# Patient Record
Sex: MALE | Race: BLACK OR AFRICAN AMERICAN | NOT HISPANIC OR LATINO | URBAN - METROPOLITAN AREA
[De-identification: names, ages, dates, MRNs, and addresses within clinical notes are randomized per-mention and may not be internally consistent; named-entity substitution may affect disease eponyms.]

---

## 2022-04-16 ENCOUNTER — INPATIENT (INPATIENT)
Facility: HOSPITAL | Age: 62
LOS: 9 days | Discharge: HOME | DRG: 64 | End: 2022-04-26
Attending: PSYCHIATRY & NEUROLOGY | Admitting: PSYCHIATRY & NEUROLOGY
Payer: COMMERCIAL

## 2022-04-16 VITALS
HEART RATE: 101 BPM | OXYGEN SATURATION: 94 % | SYSTOLIC BLOOD PRESSURE: 209 MMHG | HEIGHT: 72 IN | DIASTOLIC BLOOD PRESSURE: 134 MMHG | WEIGHT: 220.02 LBS | RESPIRATION RATE: 18 BRPM | TEMPERATURE: 97 F

## 2022-04-16 LAB
ALBUMIN SERPL ELPH-MCNC: 4.9 G/DL — SIGNIFICANT CHANGE UP (ref 3.3–5)
ALP SERPL-CCNC: 73 U/L — SIGNIFICANT CHANGE UP (ref 40–120)
ALT FLD-CCNC: 21 U/L — SIGNIFICANT CHANGE UP (ref 10–45)
ANION GAP SERPL CALC-SCNC: 18 MMOL/L — HIGH (ref 5–17)
APTT BLD: 31.4 SEC — SIGNIFICANT CHANGE UP (ref 27.5–35.5)
AST SERPL-CCNC: 52 U/L — HIGH (ref 10–40)
BASE EXCESS BLDV CALC-SCNC: 3.2 MMOL/L — HIGH (ref -2–2)
BASOPHILS # BLD AUTO: 0.01 K/UL — SIGNIFICANT CHANGE UP (ref 0–0.2)
BASOPHILS NFR BLD AUTO: 0.1 % — SIGNIFICANT CHANGE UP (ref 0–2)
BILIRUB SERPL-MCNC: 0.6 MG/DL — SIGNIFICANT CHANGE UP (ref 0.2–1.2)
BLD GP AB SCN SERPL QL: NEGATIVE — SIGNIFICANT CHANGE UP
BLOOD GAS VENOUS - CREATININE: SIGNIFICANT CHANGE UP MG/DL (ref 0.5–1.3)
BUN SERPL-MCNC: 13 MG/DL — SIGNIFICANT CHANGE UP (ref 7–23)
CA-I SERPL-SCNC: 1.13 MMOL/L — LOW (ref 1.15–1.33)
CALCIUM SERPL-MCNC: 9.6 MG/DL — SIGNIFICANT CHANGE UP (ref 8.4–10.5)
CHLORIDE BLDV-SCNC: 98 MMOL/L — SIGNIFICANT CHANGE UP (ref 96–108)
CHLORIDE SERPL-SCNC: 98 MMOL/L — SIGNIFICANT CHANGE UP (ref 96–108)
CK MB CFR SERPL CALC: 4.9 NG/ML — SIGNIFICANT CHANGE UP (ref 0–6.7)
CO2 BLDV-SCNC: 30 MMOL/L — HIGH (ref 22–26)
CO2 SERPL-SCNC: 24 MMOL/L — SIGNIFICANT CHANGE UP (ref 22–31)
CREAT SERPL-MCNC: 1.07 MG/DL — SIGNIFICANT CHANGE UP (ref 0.5–1.3)
EGFR: 78 ML/MIN/1.73M2 — SIGNIFICANT CHANGE UP
EOSINOPHIL # BLD AUTO: 0 K/UL — SIGNIFICANT CHANGE UP (ref 0–0.5)
EOSINOPHIL NFR BLD AUTO: 0 % — SIGNIFICANT CHANGE UP (ref 0–6)
FLUAV AG NPH QL: SIGNIFICANT CHANGE UP
FLUBV AG NPH QL: SIGNIFICANT CHANGE UP
GAS PNL BLDV: 134 MMOL/L — LOW (ref 136–145)
GAS PNL BLDV: SIGNIFICANT CHANGE UP
GAS PNL BLDV: SIGNIFICANT CHANGE UP
GLUCOSE BLDV-MCNC: 131 MG/DL — HIGH (ref 70–99)
GLUCOSE SERPL-MCNC: 124 MG/DL — HIGH (ref 70–99)
HCO3 BLDV-SCNC: 28 MMOL/L — SIGNIFICANT CHANGE UP (ref 22–29)
HCT VFR BLD CALC: 44.9 % — SIGNIFICANT CHANGE UP (ref 39–50)
HCT VFR BLDA CALC: 39 % — SIGNIFICANT CHANGE UP (ref 39–51)
HGB BLD CALC-MCNC: 13.1 G/DL — SIGNIFICANT CHANGE UP (ref 12.6–17.4)
HGB BLD-MCNC: 14.5 G/DL — SIGNIFICANT CHANGE UP (ref 13–17)
IMM GRANULOCYTES NFR BLD AUTO: 0.4 % — SIGNIFICANT CHANGE UP (ref 0–1.5)
INR BLD: 1.28 RATIO — HIGH (ref 0.88–1.16)
LACTATE BLDV-MCNC: 2.1 MMOL/L — HIGH (ref 0.7–2)
LYMPHOCYTES # BLD AUTO: 0.61 K/UL — LOW (ref 1–3.3)
LYMPHOCYTES # BLD AUTO: 6.3 % — LOW (ref 13–44)
MAGNESIUM SERPL-MCNC: 2 MG/DL — SIGNIFICANT CHANGE UP (ref 1.6–2.6)
MCHC RBC-ENTMCNC: 27.4 PG — SIGNIFICANT CHANGE UP (ref 27–34)
MCHC RBC-ENTMCNC: 32.3 GM/DL — SIGNIFICANT CHANGE UP (ref 32–36)
MCV RBC AUTO: 84.9 FL — SIGNIFICANT CHANGE UP (ref 80–100)
MONOCYTES # BLD AUTO: 0.55 K/UL — SIGNIFICANT CHANGE UP (ref 0–0.9)
MONOCYTES NFR BLD AUTO: 5.7 % — SIGNIFICANT CHANGE UP (ref 2–14)
NEUTROPHILS # BLD AUTO: 8.52 K/UL — HIGH (ref 1.8–7.4)
NEUTROPHILS NFR BLD AUTO: 87.5 % — HIGH (ref 43–77)
NRBC # BLD: 0 /100 WBCS — SIGNIFICANT CHANGE UP (ref 0–0)
PCO2 BLDV: 45 MMHG — SIGNIFICANT CHANGE UP (ref 42–55)
PH BLDV: 7.41 — SIGNIFICANT CHANGE UP (ref 7.32–7.43)
PHOSPHATE SERPL-MCNC: 2.7 MG/DL — SIGNIFICANT CHANGE UP (ref 2.5–4.5)
PLATELET # BLD AUTO: 166 K/UL — SIGNIFICANT CHANGE UP (ref 150–400)
PO2 BLDV: 44 MMHG — SIGNIFICANT CHANGE UP (ref 25–45)
POTASSIUM BLDV-SCNC: 3.9 MMOL/L — SIGNIFICANT CHANGE UP (ref 3.5–5.1)
POTASSIUM SERPL-MCNC: 4.8 MMOL/L — SIGNIFICANT CHANGE UP (ref 3.5–5.3)
POTASSIUM SERPL-SCNC: 4.8 MMOL/L — SIGNIFICANT CHANGE UP (ref 3.5–5.3)
PROT SERPL-MCNC: 8.9 G/DL — HIGH (ref 6–8.3)
PROTHROM AB SERPL-ACNC: 14.8 SEC — HIGH (ref 10.5–13.4)
RBC # BLD: 5.29 M/UL — SIGNIFICANT CHANGE UP (ref 4.2–5.8)
RBC # FLD: 14.5 % — SIGNIFICANT CHANGE UP (ref 10.3–14.5)
RH IG SCN BLD-IMP: POSITIVE — SIGNIFICANT CHANGE UP
RH IG SCN BLD-IMP: POSITIVE — SIGNIFICANT CHANGE UP
RSV RNA NPH QL NAA+NON-PROBE: SIGNIFICANT CHANGE UP
SAO2 % BLDV: 71.3 % — SIGNIFICANT CHANGE UP (ref 67–88)
SARS-COV-2 RNA SPEC QL NAA+PROBE: SIGNIFICANT CHANGE UP
SODIUM SERPL-SCNC: 140 MMOL/L — SIGNIFICANT CHANGE UP (ref 135–145)
TROPONIN T, HIGH SENSITIVITY RESULT: 16 NG/L — SIGNIFICANT CHANGE UP (ref 0–51)
TROPONIN T, HIGH SENSITIVITY RESULT: 22 NG/L — SIGNIFICANT CHANGE UP (ref 0–51)
WBC # BLD: 9.73 K/UL — SIGNIFICANT CHANGE UP (ref 3.8–10.5)
WBC # FLD AUTO: 9.73 K/UL — SIGNIFICANT CHANGE UP (ref 3.8–10.5)

## 2022-04-16 PROCEDURE — 71046 X-RAY EXAM CHEST 2 VIEWS: CPT | Mod: 26

## 2022-04-16 PROCEDURE — 70496 CT ANGIOGRAPHY HEAD: CPT | Mod: 26,MA

## 2022-04-16 PROCEDURE — 70498 CT ANGIOGRAPHY NECK: CPT | Mod: 26,MA

## 2022-04-16 PROCEDURE — 93010 ELECTROCARDIOGRAM REPORT: CPT

## 2022-04-16 PROCEDURE — 99291 CRITICAL CARE FIRST HOUR: CPT | Mod: 25

## 2022-04-16 RX ORDER — METOCLOPRAMIDE HCL 10 MG
10 TABLET ORAL ONCE
Refills: 0 | Status: COMPLETED | OUTPATIENT
Start: 2022-04-16 | End: 2022-04-16

## 2022-04-16 RX ORDER — ACETAMINOPHEN 500 MG
1000 TABLET ORAL ONCE
Refills: 0 | Status: COMPLETED | OUTPATIENT
Start: 2022-04-16 | End: 2022-04-16

## 2022-04-16 RX ORDER — HYDRALAZINE HCL 50 MG
10 TABLET ORAL ONCE
Refills: 0 | Status: COMPLETED | OUTPATIENT
Start: 2022-04-16 | End: 2022-04-16

## 2022-04-16 RX ORDER — HYDRALAZINE HCL 50 MG
5 TABLET ORAL ONCE
Refills: 0 | Status: COMPLETED | OUTPATIENT
Start: 2022-04-16 | End: 2022-04-16

## 2022-04-16 RX ADMIN — Medication 10 MILLIGRAM(S): at 20:33

## 2022-04-16 RX ADMIN — Medication 5 MILLIGRAM(S): at 22:16

## 2022-04-16 RX ADMIN — Medication 1000 MILLIGRAM(S): at 22:26

## 2022-04-16 RX ADMIN — Medication 1.25 MILLIGRAM(S): at 20:17

## 2022-04-16 RX ADMIN — Medication 10 MILLIGRAM(S): at 23:23

## 2022-04-16 RX ADMIN — Medication 400 MILLIGRAM(S): at 20:05

## 2022-04-16 RX ADMIN — Medication 1000 MILLIGRAM(S): at 20:31
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
## 2022-04-16 NOTE — ED ADULT NURSE NOTE - HISTORY OF COVID-19 VACCINATION
Patient Instructions by Zoë Gonzalez MD at 9/12/2019  2:40 PM     Author: Zoë Gonzalez MD Service: -- Author Type: Physician    Filed: 9/12/2019  3:10 PM Encounter Date: 9/12/2019 Status: Addendum    : Zoë Gonzalez MD (Physician)    Related Notes: Original Note by Zoë Gonzalez MD (Physician) filed at 9/12/2019  3:06 PM         9/12/2019  Wt Readings from Last 1 Encounters:   09/12/19 42 lb 6.4 oz (19.2 kg) (97 %, Z= 1.86)*     * Growth percentiles are based on CDC (Boys, 2-20 Years) data.       Acetaminophen Dosing Instructions  (May take every 4-6 hours)      WEIGHT   AGE Infant/Children's  160mg/5ml Children's   Chewable Tabs  80 mg each Kevin Strength  Chewable Tabs  160 mg     Milliliter (ml) Soft Chew Tabs Chewable Tabs   6-11 lbs 0-3 months 1.25 ml     12-17 lbs 4-11 months 2.5 ml     18-23 lbs 12-23 months 3.75 ml     24-35 lbs 2-3 years 5 ml 2 tabs    36-47 lbs 4-5 years 7.5 ml 3 tabs    48-59 lbs 6-8 years 10 ml 4 tabs 2 tabs   60-71 lbs 9-10 years 12.5 ml 5 tabs 2.5 tabs   72-95 lbs 11 years 15 ml 6 tabs 3 tabs   96 lbs and over 12 years   4 tabs     Ibuprofen Dosing Instructions- Liquid  (May take every 6-8 hours)      WEIGHT   AGE Concentrated Drops   50 mg/1.25 ml Infant/Children's   100 mg/5ml     Dropperful Milliliter (ml)   12-17 lbs 6- 11 months 1 (1.25 ml)    18-23 lbs 12-23 months 1 1/2 (1.875 ml)    24-35 lbs 2-3 years  5 ml   36-47 lbs 4-5 years  7.5 ml   48-59 lbs 6-8 years  10 ml   60-71 lbs 9-10 years  12.5 ml   72-95 lbs 11 years  15 ml       Ibuprofen Dosing Instructions- Tablets/Caplets  (May take every 6-8 hours)    WEIGHT AGE Children's   Chewable Tabs   50 mg Kevin Strength   Chewable Tabs   100 mg Kevin Strength   Caplets    100 mg     Tablet Tablet Caplet   24-35 lbs 2-3 years 2 tabs     36-47 lbs 4-5 years 3 tabs     48-59 lbs 6-8 years 4 tabs 2 tabs 2 caps   60-71 lbs 9-10 years 5 tabs 2.5 tabs 2.5 caps   72-95 lbs 11 years 6 tabs 3 tabs 3 caps            Patient Education             [facility] Parent Handout   3 Year Visit  Here are some suggestions from [facility] experts that may be of value to your family.     Reading and Talking With Your Child    Read books, sing songs, and play rhyming games with your child each day.    Reading together and talking about a books story and pictures helps your child learn how to read.    Use books as a way to talk together.    Look for ways to practice reading everywhere you go, such as stop signs or signs in the store.    Ask your child questions about the story or pictures. Ask him to tell a part of the story.    Ask your child to tell you about his day, friends, and activities.  Your Active Child  Apart from sleeping, children should not be inactive for longer than 1 hour at a time.    Be active together as a family.    Limit TV, video, and video game time to no more than 1-2 hours each day.    No TV in your say bedroom.    Keep your child from viewing shows and ads that may make her want things that are not healthy.    Be sure your child is active at home and  or .    Let us know if you need help getting your child enrolled in  or [employer]. Family Support    Take time for yourself and to be with your partner.    Parents need to stay connected to friends, their personal interests, and work.    Be aware that your parents might have different parenting styles than you.    Give your child the chance to make choices.    Show your child how to handle anger well--time alone, respectful talk, or being active. Stop hitting, biting, and fighting right away.    Reinforce rules and encourage good behavior.    Use time-outs or take away whats causing a problem.    Have regular playtimes and mealtimes together as a family.  Safety    Use a forward-facing car safety seat in the back seat of all vehicles.    Switch to a belt-positioning booster seat when your child outgrows her  forward-facing seat.    Never leave your child alone in the car, house, or yard.    Do not let young brothers and sisters watch over your child.    Your child is too young to cross the street alone.    Make sure there are operable window guards on every window on the second floor and higher. Move furniture away from windows.    Never have a gun in the home. If you must have a gun, store it unloaded and locked with the ammunition locked separately from the gun. Ask if there are guns in homes where your child plays. If so, make sure they are stored safely.    Supervise play near streets and driveways. Playing With Others  Playing with other preschoolers helps get your child ready for school.    Give your child a variety of toys for dress-up, make-believe, and imitation.    Make sure your child has the chance to play often with other preschoolers.    Help your child learn to take turns while playing games with other children.  What to Expect at Your Deanna 4 Year Visit  We will talk about    Getting ready for school    Community involvement and safety    Promoting physical activity and limiting TV time    Keeping your deanna teeth healthy    Safety inside and outside    How to be safe with adults  ________________________________  Poison Help: 1-374.738.4827  Child safety seat inspection: 5-381-CDBAFYYZR; seatcheck.org           Visit healthychildren.org for more guidance on toilet training.    [email or web address]_[email or web address]        Vaccine status unknown
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
## 2022-04-16 NOTE — ED PROVIDER NOTE - NS ED ROS FT
Gen: No F/C/NS  Head: No falls/head trauma  Eyes: No changes in vision   Resp: No cough or trouble breathing  Cardiovascular: No chest pain or palpitation  Gastroenteric: +N/V  :  No change in urine output, dysuria or hematuria   MS: No joint or muscle pain  Neuro: No headache; no abnormal movements; +confusion   Skin: No new rash
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
## 2022-04-16 NOTE — ED PROVIDER NOTE - PROGRESS NOTE DETAILS
Nemes - Radiology called w 5cm intraparenchymal bleed to R parietal lobe. No shift/herniation. NeuroSx aware, recommend decrease SBP under 160, will add Hydralazine 5mg IVP Neto ACOSTA: I was called to the bedside by RN to assess patient as he was pulling at pulse ox and shaking left arm. I assessed patient. He was awake, shaking left arm, unable to open left fist. He was able to speak to me, stating he had difficulty opening fist. Episode lasted 30 sec to 1 min. Neurosurgery was made aware. Cardene drip started. Zofran ordered as patient complained of nausea. Nsx resident came to bedside, recommended 750 mg of Keppra and repeat CT Head. Plan to admit to neurosurgery ICU.

## 2022-04-16 NOTE — ED ADULT NURSE NOTE - NSIMPLEMENTINTERV_GEN_ALL_ED
Implemented All Fall Risk Interventions:  Brandt to call system. Call bell, personal items and telephone within reach. Instruct patient to call for assistance. Room bathroom lighting operational. Non-slip footwear when patient is off stretcher. Physically safe environment: no spills, clutter or unnecessary equipment. Stretcher in lowest position, wheels locked, appropriate side rails in place. Provide visual cue, wrist band, yellow gown, etc. Monitor gait and stability. Monitor for mental status changes and reorient to person, place, and time. Review medications for side effects contributing to fall risk. Reinforce activity limits and safety measures with patient and family.

## 2022-04-16 NOTE — ED PROVIDER NOTE - ST/T WAVE
St depressions in II, inverted Ts in III, aVF, I, aVL, V4-V6, biphasic/Wellen in V3. ST elevation in aVR and V1

## 2022-04-16 NOTE — ED PROVIDER NOTE - PHYSICAL EXAMINATION
G: NAD, cooperative with exam   H: NCAT  E: EOMI, pupils dilated BL   M: Mucous membranes moist   R: CTABL, nWOB  C: RRR  A: Soft, NT/ND, no rebound/guarding   MSK: no calf tenderness, no LE edema  Neuro: CN2-12 grossly intact, A&Ox4, MS +5/5 in UE and LE BL, finger to nose smooth and rapid, gross sensation intact in UE and LE BL

## 2022-04-17 DIAGNOSIS — I16.1 HYPERTENSIVE EMERGENCY: ICD-10-CM

## 2022-04-17 LAB
A1C WITH ESTIMATED AVERAGE GLUCOSE RESULT: 6.2 % — HIGH (ref 4–5.6)
ALBUMIN SERPL ELPH-MCNC: 4.6 G/DL — SIGNIFICANT CHANGE UP (ref 3.3–5)
ALP SERPL-CCNC: 70 U/L — SIGNIFICANT CHANGE UP (ref 40–120)
ALT FLD-CCNC: 19 U/L — SIGNIFICANT CHANGE UP (ref 10–45)
ANION GAP SERPL CALC-SCNC: 14 MMOL/L — SIGNIFICANT CHANGE UP (ref 5–17)
AST SERPL-CCNC: 53 U/L — HIGH (ref 10–40)
BILIRUB DIRECT SERPL-MCNC: <0.1 MG/DL — SIGNIFICANT CHANGE UP (ref 0–0.3)
BILIRUB INDIRECT FLD-MCNC: SIGNIFICANT CHANGE UP MG/DL (ref 0.2–1)
BILIRUB SERPL-MCNC: 0.5 MG/DL — SIGNIFICANT CHANGE UP (ref 0.2–1.2)
BUN SERPL-MCNC: 15 MG/DL — SIGNIFICANT CHANGE UP (ref 7–23)
CALCIUM SERPL-MCNC: 8.6 MG/DL — SIGNIFICANT CHANGE UP (ref 8.4–10.5)
CHLORIDE SERPL-SCNC: 101 MMOL/L — SIGNIFICANT CHANGE UP (ref 96–108)
CHOLEST SERPL-MCNC: 166 MG/DL — SIGNIFICANT CHANGE UP
CO2 SERPL-SCNC: 25 MMOL/L — SIGNIFICANT CHANGE UP (ref 22–31)
CREAT SERPL-MCNC: 1.39 MG/DL — HIGH (ref 0.5–1.3)
EGFR: 57 ML/MIN/1.73M2 — LOW
ESTIMATED AVERAGE GLUCOSE: 131 MG/DL — HIGH (ref 68–114)
GLUCOSE SERPL-MCNC: 115 MG/DL — HIGH (ref 70–99)
HCT VFR BLD CALC: 40.7 % — SIGNIFICANT CHANGE UP (ref 39–50)
HDLC SERPL-MCNC: 42 MG/DL — SIGNIFICANT CHANGE UP
HGB BLD-MCNC: 12.6 G/DL — LOW (ref 13–17)
LIPID PNL WITH DIRECT LDL SERPL: 113 MG/DL — HIGH
MAGNESIUM SERPL-MCNC: 2 MG/DL — SIGNIFICANT CHANGE UP (ref 1.6–2.6)
MCHC RBC-ENTMCNC: 27.5 PG — SIGNIFICANT CHANGE UP (ref 27–34)
MCHC RBC-ENTMCNC: 31 GM/DL — LOW (ref 32–36)
MCV RBC AUTO: 88.9 FL — SIGNIFICANT CHANGE UP (ref 80–100)
NON HDL CHOLESTEROL: 124 MG/DL — SIGNIFICANT CHANGE UP
NRBC # BLD: 0 /100 WBCS — SIGNIFICANT CHANGE UP (ref 0–0)
PHOSPHATE SERPL-MCNC: 2.8 MG/DL — SIGNIFICANT CHANGE UP (ref 2.5–4.5)
PLATELET # BLD AUTO: 149 K/UL — LOW (ref 150–400)
POTASSIUM SERPL-MCNC: 3.9 MMOL/L — SIGNIFICANT CHANGE UP (ref 3.5–5.3)
POTASSIUM SERPL-SCNC: 3.9 MMOL/L — SIGNIFICANT CHANGE UP (ref 3.5–5.3)
PROT SERPL-MCNC: 8.3 G/DL — SIGNIFICANT CHANGE UP (ref 6–8.3)
RBC # BLD: 4.58 M/UL — SIGNIFICANT CHANGE UP (ref 4.2–5.8)
RBC # FLD: 14.7 % — HIGH (ref 10.3–14.5)
SODIUM SERPL-SCNC: 140 MMOL/L — SIGNIFICANT CHANGE UP (ref 135–145)
T3 SERPL-MCNC: 116 NG/DL — SIGNIFICANT CHANGE UP (ref 80–200)
T4 AB SER-ACNC: 10.5 UG/DL — SIGNIFICANT CHANGE UP (ref 4.6–12)
TRIGL SERPL-MCNC: 58 MG/DL — SIGNIFICANT CHANGE UP
TSH SERPL-MCNC: 3.1 UIU/ML — SIGNIFICANT CHANGE UP (ref 0.27–4.2)
WBC # BLD: 8.72 K/UL — SIGNIFICANT CHANGE UP (ref 3.8–10.5)
WBC # FLD AUTO: 8.72 K/UL — SIGNIFICANT CHANGE UP (ref 3.8–10.5)

## 2022-04-17 PROCEDURE — 70450 CT HEAD/BRAIN W/O DYE: CPT | Mod: 26,76

## 2022-04-17 PROCEDURE — 93306 TTE W/DOPPLER COMPLETE: CPT | Mod: 26

## 2022-04-17 PROCEDURE — 93010 ELECTROCARDIOGRAM REPORT: CPT

## 2022-04-17 PROCEDURE — 99291 CRITICAL CARE FIRST HOUR: CPT

## 2022-04-17 RX ORDER — LABETALOL HCL 100 MG
200 TABLET ORAL EVERY 8 HOURS
Refills: 0 | Status: DISCONTINUED | OUTPATIENT
Start: 2022-04-17 | End: 2022-04-18

## 2022-04-17 RX ORDER — SODIUM CHLORIDE 9 MG/ML
1000 INJECTION INTRAMUSCULAR; INTRAVENOUS; SUBCUTANEOUS
Refills: 0 | Status: DISCONTINUED | OUTPATIENT
Start: 2022-04-17 | End: 2022-04-18

## 2022-04-17 RX ORDER — ACETAMINOPHEN 500 MG
650 TABLET ORAL EVERY 6 HOURS
Refills: 0 | Status: DISCONTINUED | OUTPATIENT
Start: 2022-04-17 | End: 2022-04-26

## 2022-04-17 RX ORDER — LEVETIRACETAM 250 MG/1
500 TABLET, FILM COATED ORAL ONCE
Refills: 0 | Status: COMPLETED | OUTPATIENT
Start: 2022-04-17 | End: 2022-04-17

## 2022-04-17 RX ORDER — NICARDIPINE HYDROCHLORIDE 30 MG/1
5 CAPSULE, EXTENDED RELEASE ORAL
Qty: 40 | Refills: 0 | Status: DISCONTINUED | OUTPATIENT
Start: 2022-04-17 | End: 2022-04-17

## 2022-04-17 RX ORDER — SENNA PLUS 8.6 MG/1
2 TABLET ORAL AT BEDTIME
Refills: 0 | Status: DISCONTINUED | OUTPATIENT
Start: 2022-04-18 | End: 2022-04-26

## 2022-04-17 RX ORDER — LABETALOL HCL 100 MG
200 TABLET ORAL EVERY 8 HOURS
Refills: 0 | Status: DISCONTINUED | OUTPATIENT
Start: 2022-04-17 | End: 2022-04-17

## 2022-04-17 RX ORDER — ONDANSETRON 8 MG/1
4 TABLET, FILM COATED ORAL ONCE
Refills: 0 | Status: COMPLETED | OUTPATIENT
Start: 2022-04-17 | End: 2022-04-17

## 2022-04-17 RX ORDER — ENOXAPARIN SODIUM 100 MG/ML
40 INJECTION SUBCUTANEOUS
Refills: 0 | Status: DISCONTINUED | OUTPATIENT
Start: 2022-04-18 | End: 2022-04-18

## 2022-04-17 RX ORDER — LEVETIRACETAM 250 MG/1
500 TABLET, FILM COATED ORAL EVERY 12 HOURS
Refills: 0 | Status: DISCONTINUED | OUTPATIENT
Start: 2022-04-17 | End: 2022-04-17

## 2022-04-17 RX ORDER — LEVETIRACETAM 250 MG/1
750 TABLET, FILM COATED ORAL EVERY 12 HOURS
Refills: 0 | Status: DISCONTINUED | OUTPATIENT
Start: 2022-04-17 | End: 2022-04-18

## 2022-04-17 RX ORDER — CHLORHEXIDINE GLUCONATE 213 G/1000ML
1 SOLUTION TOPICAL
Refills: 0 | Status: DISCONTINUED | OUTPATIENT
Start: 2022-04-17 | End: 2022-04-18

## 2022-04-17 RX ORDER — SENNA PLUS 8.6 MG/1
2 TABLET ORAL AT BEDTIME
Refills: 0 | Status: DISCONTINUED | OUTPATIENT
Start: 2022-04-17 | End: 2022-04-17

## 2022-04-17 RX ADMIN — Medication 200 MILLIGRAM(S): at 18:00

## 2022-04-17 RX ADMIN — CHLORHEXIDINE GLUCONATE 1 APPLICATION(S): 213 SOLUTION TOPICAL at 22:22

## 2022-04-17 RX ADMIN — LEVETIRACETAM 400 MILLIGRAM(S): 250 TABLET, FILM COATED ORAL at 01:15

## 2022-04-17 RX ADMIN — SODIUM CHLORIDE 75 MILLILITER(S): 9 INJECTION INTRAMUSCULAR; INTRAVENOUS; SUBCUTANEOUS at 02:04

## 2022-04-17 RX ADMIN — NICARDIPINE HYDROCHLORIDE 25 MG/HR: 30 CAPSULE, EXTENDED RELEASE ORAL at 02:04

## 2022-04-17 RX ADMIN — LEVETIRACETAM 400 MILLIGRAM(S): 250 TABLET, FILM COATED ORAL at 18:00

## 2022-04-17 RX ADMIN — Medication 200 MILLIGRAM(S): at 11:51

## 2022-04-17 RX ADMIN — Medication 1 MILLIGRAM(S): at 01:30

## 2022-04-17 RX ADMIN — ONDANSETRON 4 MILLIGRAM(S): 8 TABLET, FILM COATED ORAL at 00:37

## 2022-04-17 RX ADMIN — NICARDIPINE HYDROCHLORIDE 25 MG/HR: 30 CAPSULE, EXTENDED RELEASE ORAL at 00:34

## 2022-04-17 NOTE — SPEECH LANGUAGE PATHOLOGY EVALUATION - SLP GENERAL OBSERVATIONS
Pt encountered bedside, +2LO2NC; VSS. Pt AA&Ox3 (person, place, time) and re-oriented to exact situation. Pt stated "I have high blood pressure" as reason for admission. +Inattention to left and slow response time to simple questions. +Low vocal volume. Pt able to follow simple directives and answer simple yes/no questions for purposes of evaluation.
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
## 2022-04-17 NOTE — CONSULT NOTE ADULT - TIME BILLING
61-year-old right-handed gentleman first evaluated at [facility] on 4/18/2022 after vomiting and a left hemiparesis.  History and exam as above, with minor changes.  ROS otherwise negative.  Exam.  Alert and attentive, possibly with subtle left hemineglect; oriented x3; left homonymous hemianopia (versus hemineglect); pupils: Right 6 mm and constricts to 4.5 mm to light; left 7 mm-no reaction to light (reactivity to near was not tested); left arm-no drift; left leg-very mild drift; sensory-intact to light touch with extinction on left; gait not tested; remainder of neurologic exam was nonfocal.  CT head (4/17/2022) to my eye showed a moderate sized right parasagittal parietal lobar hemorrhage, measured at 4 x 3.1 x 2.7 cm.  CTA neck and head (4/17/2022) to my eye was unremarkable.    Impression.  On 4/17/2022 he developed vomiting followed by left hemiparesis which has improved, due to a right parietal parasagittal lobar hemorrhage.  Etiology is uncertain but suspect chronic hypertension despite its atypical location.  He is probably too young for amyloid angiopathy but this remains a possibility.  Given the lobar location, as a precaution, an underlying lesion such as a micro-AVM should be further screened for.  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  Suggest.  MRI brain with contrast; assuming MRI does not show multiple lobar microbleeds to support a diagnosis of amyloid angiopathy, suspect he will benefit from a conventional cerebral angiogram; keep blood pressure less than 140/90; PT/OT.
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
## 2022-04-17 NOTE — PROGRESS NOTE ADULT - SUBJECTIVE AND OBJECTIVE BOX
Patient seen and examined at bedside.    --Anticoagulation--    T(C): 36.9 (04-17-22 @ 03:00), Max: 36.9 (04-16-22 @ 19:42)  HR: 95 (04-17-22 @ 03:45) (84 - 140)  BP: 144/93 (04-17-22 @ 03:45) (144/72 - 209/134)  RR: 20 (04-17-22 @ 03:45) (14 - 25)  SpO2: 98% (04-17-22 @ 03:45) (93% - 98%)  Wt(kg): --    Exam:  Ox3, fc, BABCOCK, intermittent LUE focal seizures
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
## 2022-04-17 NOTE — PHYSICAL THERAPY INITIAL EVALUATION ADULT - TRANSFER TRAINING, PT EVAL
GOAL: pt will perform sit-stand transfers with supervision and appropriate assistive device by 2 weeks
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
## 2022-04-17 NOTE — OCCUPATIONAL THERAPY INITIAL EVALUATION ADULT - DIAGNOSIS, OT EVAL
Pt demonstrated decreased strength, balance, and ?L neglect impacting pt's ability to participate in functional mobility and ADLs.
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
## 2022-04-17 NOTE — H&P ADULT - NSHPPHYSICALEXAM_GEN_ALL_CORE
AOx3, fc, PERRLA, No facial, L AOx3, fc, PERRLA, No facial, minimal L sided neglect, L VF cut,    BABCOCK 5/5
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
## 2022-04-17 NOTE — SPEECH LANGUAGE PATHOLOGY EVALUATION - COMMENTS
Neurosurgery: Ox3, fc, BABCOCK, intermittent LUE focal seizures     4/16 CT Brain: T Head: Acute right parietal intraparenchymal hemorrhage measuring approximately 3.1 x 2.7 x 4 cm with mild adjacent vasogenic edema. There is a chronic appearing lacunar infarcts in the bilateral basal ganglia Impaired reading at the basic single word level 2/2 left inattention vs reading difficulty. Once words placed on right side of page, L low vocal volume/intensity Pt does not utilize AAC or gestures as primary means of communication Receptive language skills impaired with increased length/complexity of material. Breakdown at the complex comprehension level Speech intelligibility deemed WFL Cognitive deficits as listed above. Reduced organization of thoughts/ideas, delayed response time, reduced flexibility of thoughts; Reduced insight into deficits, especially left inattention. Poor safety awareness. Clock drawing demonstrates poor spacing of numbers, entirely on right side of clock. LT) Pt will maximize cognitive-linguistic skills Expressive language skills intact at the basic level with breakdown with increased length/complexity 2/2 cognitive deficits Left inattention severely negatively impacts writing at this time as pt unable to attend to pen/paper tasks

## 2022-04-17 NOTE — OCCUPATIONAL THERAPY INITIAL EVALUATION ADULT - VISUAL ASSESSMENT: VISUAL FIELD CUTS
[Good] : ~his/her~  mood as  good [No] : In the past 12 months have you used drugs other than those required for medical reasons? No [No falls in past year] : Patient reported no falls in the past year [0] : 2) Feeling down, depressed, or hopeless: Not at all (0) [Patient declined mammogram] : Patient declined mammogram [Patient declined PAP Smear] : Patient declined PAP Smear [Patient declined bone density test] : Patient declined bone density test [Patient declined colonoscopy] : Patient declined colonoscopy [HIV test declined] : HIV test declined [None] : None [Feels Safe at Home] : Feels safe at home [Fully functional (bathing, dressing, toileting, transferring, walking, feeding)] : Fully functional (bathing, dressing, toileting, transferring, walking, feeding) [Fully functional (using the telephone, shopping, preparing meals, housekeeping, doing laundry, using] : Fully functional and needs no help or supervision to perform IADLs (using the telephone, shopping, preparing meals, housekeeping, doing laundry, using transportation, managing medications and managing finances) [Smoke Detector] : smoke detector [Carbon Monoxide Detector] : carbon monoxide detector [Seat Belt] :  uses seat belt [Sunscreen] : uses sunscreen [With Patient/Caregiver] : With Patient/Caregiver [FreeTextEntry1] : Check up\par  [] : No [de-identified] : None [[id number]] : 0 [Change in mental status noted] : No change in mental status noted [Reports changes in hearing] : Reports no changes in hearing [Reports changes in vision] : Reports no changes in vision [Reports changes in dental health] : Reports no changes in dental health [HepatitisCDate] : 04/19 [HepatitisCComments] : Negative [AdvancecareDate] : 04/21 not observed

## 2022-04-17 NOTE — SWALLOW BEDSIDE ASSESSMENT ADULT - ADDITIONAL RECOMMENDATIONS
Monitor for s/s aspiration/laryngeal penetration. If noted:  D/C p.o. intake, provide non-oral nutrition/hydration/meds, and contact this service @ [phone]  Maintain good oral hygiene    LTG: Pt will tolerate least restrictive diet without overt signs or symptoms of penetration or aspiration
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
## 2022-04-17 NOTE — SWALLOW BEDSIDE ASSESSMENT ADULT - COMMENTS
Neurosurgery: Ox3, fc, BABCOCK, intermittent LUE focal seizures     4/16 CT Brain: T Head: Acute right parietal intraparenchymal hemorrhage measuring approximately 3.1 x 2.7 x 4 cm with mild adjacent vasogenic edema. There is a chronic appearing lacunar infarcts in the bilateral basal ganglia

## 2022-04-17 NOTE — PROGRESS NOTE ADULT - ASSESSMENT
R Parietal and occipital ICH  -  - 160  -Keppra  -s/p 1mg ativan  - CTH in AM  - MRI whenever stable
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
## 2022-04-17 NOTE — SPEECH LANGUAGE PATHOLOGY EVALUATION - SLP ORGANIZATION
Per Dr. Lee, patient was notified of Amniocentesis result: Pattern Consistent with Trisomy 18.    Chromosome analysis pending at this time.  
impaired
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
## 2022-04-17 NOTE — SPEECH LANGUAGE PATHOLOGY EVALUATION - SLP VISUAL IMPAIRMENT EFFECTING FUNCTION
67 y/o male with a PMHx of HTN, gout, CAD, CRI, CABG in 2017 presents to the ED BIBEMS s/p syncopal episode today. Pt reports he got out of bed this morning and did not feel lightheaded at the time but exiting the bathroom pt felt nauseous, shaky, and dizzy before syncopal episode. Pt's wife caught the pt before he fell to the ground. Pt's wife reports pallor appearance. Pt denies any current symptoms. Denies head injury, CP, SOB, difficulty breathing. No past episodes of syncope. On anticoagulation. Former smoker. No other complaints at this time. NKDA. PCP: Eliza Fernandes Left inattention negatively impacts the ability to read/write
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
## 2022-04-17 NOTE — SWALLOW BEDSIDE ASSESSMENT ADULT - SLP GENERAL OBSERVATIONS
Pt encountered bedside, +2LO2NC; VSS. Pt AA&Ox3 (person, place, time) and re-oriented to exact situation. Pt stated "I have high blood pressure" as reason for admission. +Inattention to left and slow response time to simple questions. +Low vocal volume. Pt able to follow simple directives and answer simple yes/no questions for purposes of evaluation. See Language evaluation for details.

## 2022-04-17 NOTE — CHART NOTE - NSCHARTNOTEFT_GEN_A_CORE
CAPRINI SCORE [CLOT] Score on Admission for     AGE RELATED RISK FACTORS                                                       MOBILITY RELATED FACTORS  [ ] Age 41-60 years                                            (1 Point)                  [ ] Bed rest                                                        (1 Point)  [ x ] Age: 61-74 years                                           (2 Points)                 [ ] Plaster cast                                                   (2 Points)  [ ] Age= 75 years                                              (3 Points)                 [ ] Bed bound for more than 72 hours                 (2 Points)    DISEASE RELATED RISK FACTORS                                               GENDER SPECIFIC FACTORS  [ ] Edema in the lower extremities                       (1 Point)                  [ ] Pregnancy                                                     (1 Point)  [ ] Varicose veins                                               (1 Point)                  [ ] Post-partum < 6 weeks                                   (1 Point)             [ x ] BMI > 25 Kg/m2                                            (1 Point)                  [ ] Hormonal therapy  or oral contraception          (1 Point)                 [ ] Sepsis (in the previous month)                        (1 Point)                  [ ] History of pregnancy complications                 (1 point)  [ ] Pneumonia or serious lung disease                                               [ ] Unexplained or recurrent                     (1 Point)           (in the previous month)                               (1 Point)  [ ] Abnormal pulmonary function test                     (1 Point)                 SURGERY RELATED RISK FACTORS (include planned surgeries)  [ ] Acute myocardial infarction                              (1 Point)                 [ ]  Section                                             (1 Point)  [ ] Congestive heart failure (in the previous month)  (1 Point)               [ ] Minor surgery                                                  (1 Point)   [ ] Inflammatory bowel disease                             (1 Point)                 [ ] Arthroscopic surgery                                        (2 Points)  [ ] Central venous access                                      (2 Points)                [ ] General surgery lasting more than 45 minutes   (2 Points)       [ x ] Stroke (in the previous month)                          (5 Points)               [ ] Elective arthroplasty                                         (5 Points)            [ ] current or past malignancy                              (2 Points)                                                                                                       HEMATOLOGY RELATED FACTORS                                                 TRAUMA RELATED RISK FACTORS  [ ] Prior episodes of VTE                                     (3 Points)                [ ] Fracture of the hip, pelvis, or leg                       (5 Points)  [ ] Positive family history for VTE                         (3 Points)                 [ ] Acute spinal cord injury (in the previous month)  (5 Points)  [ ] Prothrombin 28217 A                                     (3 Points)                 [ ] Paralysis  (less than 1 month)                             (5 Points)  [ ] Factor V Leiden                                             (3 Points)                  [ ] Multiple Trauma within 1 month                        (5 Points)  [ ] Lupus anticoagulants                                     (3 Points)                                                           [ ] Anticardiolipin antibodies                               (3 Points)                                                       [ ] High homocysteine in the blood                      (3 Points)                                             [ ] Other congenital or acquired thrombophilia      (3 Points)                                                [ ] Heparin induced thrombocytopenia                  (3 Points)   [ ] Previous Covid-19 confirmed infection             ( 3 Points)                                        Total Score [     8     ]    Risk:  Very low 0   Low 1 to 2   Moderate 3 to 4   High =5       VTE Prophylasix Recommednations:  [ x ] mechanical pneumatic compression devices                                      [ ] contraindicated due to: _____________________  [ ] chemo prophylasix                                                                                   [ x ] contraindicated due to: New Bleed    **** MODERATE/HIGH LIKELIHOOD DVT PRESENT ON ADMISSION  [ x ] (please order LE dopplers within 24 hours of admission)
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
## 2022-04-17 NOTE — PROGRESS NOTE ADULT - SUBJECTIVE AND OBJECTIVE BOX
O:  T(C): 37.6 (04-17-22 @ 15:00), Max: 37.6 (04-17-22 @ 07:00)  HR: 80 (04-17-22 @ 17:00) (74 - 140)  BP: 120/74 (04-17-22 @ 17:00) (116/81 - 208/104)  RR: 13 (04-17-22 @ 17:00) (13 - 26)  SpO2: 100% (04-17-22 @ 17:00) (93% - 100%)  04-16-22 @ 07:01  -  04-17-22 @ 07:00  --------------------------------------------------------  IN: 775 mL / OUT: 0 mL / NET: 775 mL    04-17-22 @ 07:01  -  04-17-22 @ 19:20  --------------------------------------------------------  IN: 940 mL / OUT: 1000 mL / NET: -60 mL    acetaminophen     Tablet .. 650 milliGRAM(s) Oral every 6 hours PRN  chlorhexidine 4% Liquid 1 Application(s) Topical <User Schedule>  labetalol 200 milliGRAM(s) Oral every 8 hours  levETIRAcetam  IVPB 750 milliGRAM(s) IV Intermittent every 12 hours  niCARdipine Infusion 5 mG/Hr IV Continuous <Continuous>  sodium chloride 0.9%. 1000 milliLiter(s) IV Continuous <Continuous>    NEURO EXAM     LABS:  Na: 140 (04-16 @ 20:02)  K: 4.8 (04-16 @ 20:02)  Cl: 98 (04-16 @ 20:02)  CO2: 24 (04-16 @ 20:02)  BUN: 13 (04-16 @ 20:02)  Cr: 1.07 (04-16 @ 20:02)  Glu: 124(04-16 @ 20:02)    Hgb: 14.5 (04-16 @ 20:02)  Hct: 44.9 (04-16 @ 20:02)  WBC: 9.73 (04-16 @ 20:02)  Plt: 166 (04-16 @ 20:02)    INR: 1.28 04-16-22 @ 22:46  PTT: 31.4 04-16-22 @ 22:46            a/p left occipital ICH     Brain edema, hypertonic saline                 , keep sodium in                  , BMP q 6 hr   CV : SBP goal   100-160 mmhg   Pulm:     GI: on TF, at goal   Renal: see neuro   ID afebrile  Endo: DM, target sugar 120-180   Hem: SCD,   hold chemoprophylaxis as POD     35 critical care time as patient is at risk for brain henrniation, ICP crisis, seizures, ICH  O:  T(C): 37.6 (04-17-22 @ 15:00), Max: 37.6 (04-17-22 @ 07:00)  HR: 80 (04-17-22 @ 17:00) (74 - 140)  BP: 120/74 (04-17-22 @ 17:00) (116/81 - 208/104)  RR: 13 (04-17-22 @ 17:00) (13 - 26)  SpO2: 100% (04-17-22 @ 17:00) (93% - 100%)  04-16-22 @ 07:01  -  04-17-22 @ 07:00  --------------------------------------------------------  IN: 775 mL / OUT: 0 mL / NET: 775 mL    04-17-22 @ 07:01  -  04-17-22 @ 19:20  --------------------------------------------------------  IN: 940 mL / OUT: 1000 mL / NET: -60 mL    acetaminophen     Tablet .. 650 milliGRAM(s) Oral every 6 hours PRN  chlorhexidine 4% Liquid 1 Application(s) Topical <User Schedule>  labetalol 200 milliGRAM(s) Oral every 8 hours  levETIRAcetam  IVPB 750 milliGRAM(s) IV Intermittent every 12 hours  niCARdipine Infusion 5 mG/Hr IV Continuous <Continuous>  sodium chloride 0.9%. 1000 milliLiter(s) IV Continuous <Continuous>    NEURO EXAM     LABS:  Na: 140 (04-16 @ 20:02)  K: 4.8 (04-16 @ 20:02)  Cl: 98 (04-16 @ 20:02)  CO2: 24 (04-16 @ 20:02)  BUN: 13 (04-16 @ 20:02)  Cr: 1.07 (04-16 @ 20:02)  Glu: 124(04-16 @ 20:02)    Hgb: 14.5 (04-16 @ 20:02)  Hct: 44.9 (04-16 @ 20:02)  WBC: 9.73 (04-16 @ 20:02)  Plt: 166 (04-16 @ 20:02)    INR: 1.28 04-16-22 @ 22:46  PTT: 31.4 04-16-22 @ 22:46            a/p left occipital ICH could be from HTN   CTA no vascular malformation   neuro checks q 2 hr   MRI brain wwo   Brain edema, hypertonic saline                 , keep sodium in                  , BMP q 6 hr   seizure on keppra   CV : SBP goal   100-160 mmhg   Pulm:     GI: on TF, at goal   Renal: see neuro   ID afebrile  Endo: DM, target sugar 120-180   Hem: SCD,   hold chemoprophylaxis as POD     35 critical care time as patient is at risk for brain henrniation, ICP crisis, seizures, ICH  Patient seen and examined    T(C): 37.5 (04-17-22 @ 19:00), Max: 37.6 (04-17-22 @ 07:00)  HR: 70 (04-17-22 @ 21:00) (70 - 140)  BP: 132/89 (04-17-22 @ 21:00) (116/81 - 208/104)  RR: 17 (04-17-22 @ 21:00) (13 - 26)  SpO2: 97% (04-17-22 @ 21:00) (93% - 100%)  04-16-22 @ 07:01  -  04-17-22 @ 07:00  --------------------------------------------------------  IN: 775 mL / OUT: 0 mL / NET: 775 mL    04-17-22 @ 07:01  -  04-17-22 @ 21:41  --------------------------------------------------------  IN: 1165 mL / OUT: 1000 mL / NET: 165 mL    acetaminophen     Tablet .. 650 milliGRAM(s) Oral every 6 hours PRN  chlorhexidine 4% Liquid 1 Application(s) Topical <User Schedule>  labetalol 200 milliGRAM(s) Oral every 8 hours  levETIRAcetam  IVPB 750 milliGRAM(s) IV Intermittent every 12 hours  niCARdipine Infusion 5 mG/Hr IV Continuous <Continuous>  sodium chloride 0.9%. 1000 milliLiter(s) IV Continuous <Continuous>        Exam stable    labs and imaging reviewed     Continue same management   d/c nicardipine   neuro checks q 2 hr   keep NS 75 ml/hr until he is tolerating PO intake     Kait Wilhelm [facility] attending   not critical
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
## 2022-04-18 LAB
ANION GAP SERPL CALC-SCNC: 15 MMOL/L — SIGNIFICANT CHANGE UP (ref 5–17)
BUN SERPL-MCNC: 14 MG/DL — SIGNIFICANT CHANGE UP (ref 7–23)
CALCIUM SERPL-MCNC: 8.9 MG/DL — SIGNIFICANT CHANGE UP (ref 8.4–10.5)
CHLORIDE SERPL-SCNC: 102 MMOL/L — SIGNIFICANT CHANGE UP (ref 96–108)
CO2 SERPL-SCNC: 22 MMOL/L — SIGNIFICANT CHANGE UP (ref 22–31)
CREAT SERPL-MCNC: 1.11 MG/DL — SIGNIFICANT CHANGE UP (ref 0.5–1.3)
EGFR: 75 ML/MIN/1.73M2 — SIGNIFICANT CHANGE UP
GLUCOSE SERPL-MCNC: 133 MG/DL — HIGH (ref 70–99)
HCT VFR BLD CALC: 40 % — SIGNIFICANT CHANGE UP (ref 39–50)
HGB BLD-MCNC: 12.7 G/DL — LOW (ref 13–17)
MAGNESIUM SERPL-MCNC: 1.8 MG/DL — SIGNIFICANT CHANGE UP (ref 1.6–2.6)
MCHC RBC-ENTMCNC: 27.5 PG — SIGNIFICANT CHANGE UP (ref 27–34)
MCHC RBC-ENTMCNC: 31.8 GM/DL — LOW (ref 32–36)
MCV RBC AUTO: 86.8 FL — SIGNIFICANT CHANGE UP (ref 80–100)
NRBC # BLD: 0 /100 WBCS — SIGNIFICANT CHANGE UP (ref 0–0)
NT-PROBNP SERPL-SCNC: 1822 PG/ML — HIGH (ref 0–300)
PHOSPHATE SERPL-MCNC: 2.1 MG/DL — LOW (ref 2.5–4.5)
PLATELET # BLD AUTO: 160 K/UL — SIGNIFICANT CHANGE UP (ref 150–400)
POTASSIUM SERPL-MCNC: 3.7 MMOL/L — SIGNIFICANT CHANGE UP (ref 3.5–5.3)
POTASSIUM SERPL-SCNC: 3.7 MMOL/L — SIGNIFICANT CHANGE UP (ref 3.5–5.3)
RBC # BLD: 4.61 M/UL — SIGNIFICANT CHANGE UP (ref 4.2–5.8)
RBC # FLD: 14.8 % — HIGH (ref 10.3–14.5)
SODIUM SERPL-SCNC: 139 MMOL/L — SIGNIFICANT CHANGE UP (ref 135–145)
TROPONIN T, HIGH SENSITIVITY RESULT: 42 NG/L — SIGNIFICANT CHANGE UP (ref 0–51)
WBC # BLD: 12.68 K/UL — HIGH (ref 3.8–10.5)
WBC # FLD AUTO: 12.68 K/UL — HIGH (ref 3.8–10.5)

## 2022-04-18 PROCEDURE — 93010 ELECTROCARDIOGRAM REPORT: CPT

## 2022-04-18 PROCEDURE — 99291 CRITICAL CARE FIRST HOUR: CPT

## 2022-04-18 PROCEDURE — 99255 IP/OBS CONSLTJ NEW/EST HI 80: CPT

## 2022-04-18 PROCEDURE — 70553 MRI BRAIN STEM W/O & W/DYE: CPT | Mod: 26

## 2022-04-18 PROCEDURE — 70450 CT HEAD/BRAIN W/O DYE: CPT | Mod: 26

## 2022-04-18 PROCEDURE — 71045 X-RAY EXAM CHEST 1 VIEW: CPT | Mod: 26

## 2022-04-18 RX ORDER — POTASSIUM CHLORIDE 20 MEQ
10 PACKET (EA) ORAL
Refills: 0 | Status: COMPLETED | OUTPATIENT
Start: 2022-04-18 | End: 2022-04-19

## 2022-04-18 RX ORDER — IPRATROPIUM/ALBUTEROL SULFATE 18-103MCG
3 AEROSOL WITH ADAPTER (GRAM) INHALATION EVERY 6 HOURS
Refills: 0 | Status: DISCONTINUED | OUTPATIENT
Start: 2022-04-18 | End: 2022-04-26

## 2022-04-18 RX ORDER — HYDRALAZINE HCL 50 MG
10 TABLET ORAL EVERY 6 HOURS
Refills: 0 | Status: DISCONTINUED | OUTPATIENT
Start: 2022-04-18 | End: 2022-04-26

## 2022-04-18 RX ORDER — HEPARIN SODIUM 5000 [USP'U]/ML
5000 INJECTION INTRAVENOUS; SUBCUTANEOUS EVERY 12 HOURS
Refills: 0 | Status: DISCONTINUED | OUTPATIENT
Start: 2022-04-18 | End: 2022-04-20

## 2022-04-18 RX ORDER — POTASSIUM CHLORIDE 20 MEQ
20 PACKET (EA) ORAL ONCE
Refills: 0 | Status: COMPLETED | OUTPATIENT
Start: 2022-04-18 | End: 2022-04-18

## 2022-04-18 RX ORDER — POTASSIUM PHOSPHATE, MONOBASIC POTASSIUM PHOSPHATE, DIBASIC 236; 224 MG/ML; MG/ML
15 INJECTION, SOLUTION INTRAVENOUS ONCE
Refills: 0 | Status: COMPLETED | OUTPATIENT
Start: 2022-04-18 | End: 2022-04-18

## 2022-04-18 RX ORDER — HYDRALAZINE HCL 50 MG
50 TABLET ORAL EVERY 8 HOURS
Refills: 0 | Status: DISCONTINUED | OUTPATIENT
Start: 2022-04-18 | End: 2022-04-26

## 2022-04-18 RX ORDER — POTASSIUM CHLORIDE 20 MEQ
40 PACKET (EA) ORAL ONCE
Refills: 0 | Status: DISCONTINUED | OUTPATIENT
Start: 2022-04-18 | End: 2022-04-18

## 2022-04-18 RX ORDER — MAGNESIUM SULFATE 500 MG/ML
1 VIAL (ML) INJECTION ONCE
Refills: 0 | Status: COMPLETED | OUTPATIENT
Start: 2022-04-18 | End: 2022-04-18

## 2022-04-18 RX ORDER — HYDRALAZINE HCL 50 MG
10 TABLET ORAL ONCE
Refills: 0 | Status: COMPLETED | OUTPATIENT
Start: 2022-04-18 | End: 2022-04-18

## 2022-04-18 RX ORDER — LEVETIRACETAM 250 MG/1
750 TABLET, FILM COATED ORAL
Refills: 0 | Status: DISCONTINUED | OUTPATIENT
Start: 2022-04-18 | End: 2022-04-19

## 2022-04-18 RX ORDER — FUROSEMIDE 40 MG
20 TABLET ORAL ONCE
Refills: 0 | Status: COMPLETED | OUTPATIENT
Start: 2022-04-18 | End: 2022-04-18

## 2022-04-18 RX ORDER — LABETALOL HCL 100 MG
100 TABLET ORAL THREE TIMES A DAY
Refills: 0 | Status: DISCONTINUED | OUTPATIENT
Start: 2022-04-18 | End: 2022-04-20

## 2022-04-18 RX ORDER — PIPERACILLIN AND TAZOBACTAM 4; .5 G/20ML; G/20ML
3.38 INJECTION, POWDER, LYOPHILIZED, FOR SOLUTION INTRAVENOUS EVERY 8 HOURS
Refills: 0 | Status: DISCONTINUED | OUTPATIENT
Start: 2022-04-19 | End: 2022-04-22

## 2022-04-18 RX ORDER — PIPERACILLIN AND TAZOBACTAM 4; .5 G/20ML; G/20ML
3.38 INJECTION, POWDER, LYOPHILIZED, FOR SOLUTION INTRAVENOUS ONCE
Refills: 0 | Status: COMPLETED | OUTPATIENT
Start: 2022-04-18 | End: 2022-04-18

## 2022-04-18 RX ORDER — HYDRALAZINE HCL 50 MG
25 TABLET ORAL THREE TIMES A DAY
Refills: 0 | Status: DISCONTINUED | OUTPATIENT
Start: 2022-04-18 | End: 2022-04-18

## 2022-04-18 RX ADMIN — Medication 20 MILLIGRAM(S): at 22:55

## 2022-04-18 RX ADMIN — HEPARIN SODIUM 5000 UNIT(S): 5000 INJECTION INTRAVENOUS; SUBCUTANEOUS at 17:15

## 2022-04-18 RX ADMIN — Medication 25 MILLIGRAM(S): at 15:44

## 2022-04-18 RX ADMIN — Medication 10 MILLIGRAM(S): at 20:15

## 2022-04-18 RX ADMIN — HEPARIN SODIUM 5000 UNIT(S): 5000 INJECTION INTRAVENOUS; SUBCUTANEOUS at 05:32

## 2022-04-18 RX ADMIN — LEVETIRACETAM 400 MILLIGRAM(S): 250 TABLET, FILM COATED ORAL at 05:32

## 2022-04-18 RX ADMIN — Medication 25 MILLIGRAM(S): at 05:32

## 2022-04-18 RX ADMIN — SODIUM CHLORIDE 75 MILLILITER(S): 9 INJECTION INTRAMUSCULAR; INTRAVENOUS; SUBCUTANEOUS at 01:03

## 2022-04-18 RX ADMIN — Medication 100 MILLIGRAM(S): at 21:38

## 2022-04-18 RX ADMIN — SENNA PLUS 2 TABLET(S): 8.6 TABLET ORAL at 21:38

## 2022-04-18 RX ADMIN — Medication 100 GRAM(S): at 22:54

## 2022-04-18 RX ADMIN — Medication 100 MILLIGRAM(S): at 15:44

## 2022-04-18 RX ADMIN — Medication 20 MILLIEQUIVALENT(S): at 01:01

## 2022-04-18 RX ADMIN — POTASSIUM PHOSPHATE, MONOBASIC POTASSIUM PHOSPHATE, DIBASIC 62.5 MILLIMOLE(S): 236; 224 INJECTION, SOLUTION INTRAVENOUS at 01:01

## 2022-04-18 RX ADMIN — LEVETIRACETAM 750 MILLIGRAM(S): 250 TABLET, FILM COATED ORAL at 17:15

## 2022-04-18 RX ADMIN — Medication 10 MILLIGRAM(S): at 21:38

## 2022-04-18 RX ADMIN — POTASSIUM PHOSPHATE, MONOBASIC POTASSIUM PHOSPHATE, DIBASIC 62.5 MILLIMOLE(S): 236; 224 INJECTION, SOLUTION INTRAVENOUS at 22:55

## 2022-04-18 RX ADMIN — Medication 50 MILLIGRAM(S): at 21:38

## 2022-04-18 NOTE — PATIENT PROFILE ADULT - NSPROEXTENSIONSOFSELF_GEN_A_NUR
Android, wallet (license, credit cards), red bag (blue underwear, back scratcher, small blue bag, white t-shirt)/eyeglasses

## 2022-04-18 NOTE — PROGRESS NOTE ADULT - SUBJECTIVE AND OBJECTIVE BOX
Patient seen and examined    T(C): 36.9 (04-18-22 @ 19:00), Max: 37.6 (04-18-22 @ 07:00)  HR: 92 (04-18-22 @ 21:00) (53 - 98)  BP: 154/96 (04-18-22 @ 20:30) (133/77 - 170/96)  RR: 36 (04-18-22 @ 21:00) (16 - 36)  SpO2: 94% (04-18-22 @ 21:00) (94% - 100%)  04-17-22 @ 07:01  -  04-18-22 @ 07:00  --------------------------------------------------------  IN: 2465 mL / OUT: 1500 mL / NET: 965 mL    04-18-22 @ 07:01  -  04-18-22 @ 21:06  --------------------------------------------------------  IN: 1065 mL / OUT: 1350 mL / NET: -285 mL    acetaminophen     Tablet .. 650 milliGRAM(s) Oral every 6 hours PRN  heparin   Injectable 5000 Unit(s) SubCutaneous every 12 hours  hydrALAZINE 50 milliGRAM(s) Oral every 8 hours  labetalol 100 milliGRAM(s) Oral three times a day  levETIRAcetam 750 milliGRAM(s) Oral two times a day  senna 2 Tablet(s) Oral at bedtime        Exam stable  has good breath sounds bilaterally   slightly tachypneic     labs and imaging reviewed     Plan:   slight tachypnea, no desaturation  d/c fluids   increase hydralazine 50 mg q 8 hr    labetolol 10 mg PRN for SBP> 180 mmhg   will get chest xray  ECG unchanged   denying chest pain   humidified face tent as he is complaining of stuffy noise  will get cbc, bmp, troponin now     not critical     [facility] attending  O: TACHYPNEA AND TACHYCARDIA     T(C): 36.9 (04-18-22 @ 19:00), Max: 37.6 (04-18-22 @ 07:00)  HR: 92 (04-18-22 @ 21:00) (53 - 98)  BP: 154/96 (04-18-22 @ 20:30) (133/77 - 170/96)  RR: 36 (04-18-22 @ 21:00) (16 - 36)  SpO2: 94% (04-18-22 @ 21:00) (94% - 100%)  04-17-22 @ 07:01  -  04-18-22 @ 07:00  --------------------------------------------------------  IN: 2465 mL / OUT: 1500 mL / NET: 965 mL    04-18-22 @ 07:01  -  04-18-22 @ 21:06  --------------------------------------------------------  IN: 1065 mL / OUT: 1350 mL / NET: -285 mL    acetaminophen     Tablet .. 650 milliGRAM(s) Oral every 6 hours PRN  heparin   Injectable 5000 Unit(s) SubCutaneous every 12 hours  hydrALAZINE 50 milliGRAM(s) Oral every 8 hours  labetalol 100 milliGRAM(s) Oral three times a day  levETIRAcetam 750 milliGRAM(s) Oral two times a day  senna 2 Tablet(s) Oral at bedtime        EXAMINATION:  General:  calm  HEENT:  MMM  Neuro:  Drowsy oriented x 3, pupils 4 mm reactive bilateral, follows commands, moves all extremities, Left UE drift, Left Hemianopsia   Cards:  RRR  Respiratory:  RESPIRATORY DISTRESS, TACHYPNEIC, HAS RHONCHI   Adomen:  soft  Extremities:  no edema  Skin:  warm/dry      LABS:  Na: 139 (04-18 @ 21:21), 140 (04-17 @ 21:57), 140 (04-16 @ 20:02)  K: 3.7 (04-18 @ 21:21), 3.9 (04-17 @ 21:57), 4.8 (04-16 @ 20:02)  Cl: 102 (04-18 @ 21:21), 101 (04-17 @ 21:57), 98 (04-16 @ 20:02)  CO2: 22 (04-18 @ 21:21), 25 (04-17 @ 21:57), 24 (04-16 @ 20:02)  BUN: 14 (04-18 @ 21:21), 15 (04-17 @ 21:57), 13 (04-16 @ 20:02)  Cr: 1.11 (04-18 @ 21:21), 1.39 (04-17 @ 21:57), 1.07 (04-16 @ 20:02)  Glu: 133(04-18 @ 21:21), 115(04-17 @ 21:57), 124(04-16 @ 20:02)    Hgb: 12.7 (04-18 @ 21:21), 12.6 (04-17 @ 21:57), 14.5 (04-16 @ 20:02)  Hct: 40.0 (04-18 @ 21:21), 40.7 (04-17 @ 21:57), 44.9 (04-16 @ 20:02)  WBC: 12.68 (04-18 @ 21:21), 8.72 (04-17 @ 21:57), 9.73 (04-16 @ 20:02)  Plt: 160 (04-18 @ 21:21), 149 (04-17 @ 21:57), 166 (04-16 @ 20:02)    INR: 1.28 04-16-22 @ 22:46  PTT: 31.4 04-16-22 @ 22:46            Plan:   Patient  tachypnea,  desaturation, fluid was discontinued, STAT chest xray shows right middle lobe infiltrates , will give lasix 20 mg iv once, will repeat chest xray tomorrow, keep NPO in case of worsening respiratory status and requiring intubation   will need formal swallow eval due to concern for aspiration   will start zosyn   increase hydralazine to  50 mg q 8 hr due to high BP and concern for ICH expansion     labetolol 10 mg PRN for SBP> 180 mmhg   ECG unchanged , troponin negative   aggressive chest PT   creat downtrending , TAYE improving     30 critical care time patient is in respiratory distress at risk for requiring intubation, will keep in the NSCU     Kait Wilhelm [facility] attending

## 2022-04-18 NOTE — PROGRESS NOTE ADULT - ASSESSMENT
NEURO:    q1h neuro checks  CTH tomorrow AM  Keppra 750 mg BID  Tylenol  Stroke core measures     CARDS:    -160  Nicardipine gtt PRN  TTE  ECG    PULM:    RA  sat > 92%    RENAL:   NS at 75 ml/hr  Cr 1.07  BMP daily    GASTRO:    NPO  Bowel regimen     HEME:     DVT prophylaxis: SCDs, hold anticoagulation, fresh ICH     ENDO:    BG goal 140-180 mg/dl    ID:    Monitor for fever     Code status:  Full code  Disposition:  ICU   62 yo M w HTN presents w R parietal ICH    NEURO:    q2h neuro checks  stroke following  CTH today stable  MRI w wo  Keppra 750 mg BID  Tylenol  Stroke core measures     CARDS:    -160  on hydralazine 25 TID and labetalol 100 tid  TTE severe LVH   ECG LVH, sinus arrhythmia    PULM:    RA  sat > 92%    RENAL:   NS at 75 ml/hr  Cr 1.3 worsening, poss contrast induced cont fluids  BMP daily    GASTRO:    mild thick liquid diet  Bowel regimen   LBM PTA    HEME:     DVT prophylaxis: SCDs, SQH tonight    ENDO:  a1c 6.2  BG goal 140-180 mg/dl    ID:    Monitor for fever     Code status:  Full code  Disposition:  ICU vs stroke

## 2022-04-18 NOTE — PATIENT PROFILE ADULT - FALL HARM RISK - HARM RISK INTERVENTIONS
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
## 2022-04-19 LAB
HCT VFR BLD CALC: 38.8 % — LOW (ref 39–50)
HGB BLD-MCNC: 12.5 G/DL — LOW (ref 13–17)
MCHC RBC-ENTMCNC: 27.4 PG — SIGNIFICANT CHANGE UP (ref 27–34)
MCHC RBC-ENTMCNC: 32.2 GM/DL — SIGNIFICANT CHANGE UP (ref 32–36)
MCV RBC AUTO: 85.1 FL — SIGNIFICANT CHANGE UP (ref 80–100)
NRBC # BLD: 0 /100 WBCS — SIGNIFICANT CHANGE UP (ref 0–0)
PLATELET # BLD AUTO: 167 K/UL — SIGNIFICANT CHANGE UP (ref 150–400)
PROCALCITONIN SERPL-MCNC: 0.07 NG/ML — SIGNIFICANT CHANGE UP (ref 0.02–0.1)
RBC # BLD: 4.56 M/UL — SIGNIFICANT CHANGE UP (ref 4.2–5.8)
RBC # FLD: 14.8 % — HIGH (ref 10.3–14.5)
WBC # BLD: 9.54 K/UL — SIGNIFICANT CHANGE UP (ref 3.8–10.5)
WBC # FLD AUTO: 9.54 K/UL — SIGNIFICANT CHANGE UP (ref 3.8–10.5)

## 2022-04-19 PROCEDURE — 99232 SBSQ HOSP IP/OBS MODERATE 35: CPT

## 2022-04-19 PROCEDURE — 71045 X-RAY EXAM CHEST 1 VIEW: CPT | Mod: 26

## 2022-04-19 RX ORDER — FUROSEMIDE 40 MG
20 TABLET ORAL ONCE
Refills: 0 | Status: COMPLETED | OUTPATIENT
Start: 2022-04-19 | End: 2022-04-19

## 2022-04-19 RX ORDER — ACETYLCYSTEINE 200 MG/ML
4 VIAL (ML) MISCELLANEOUS EVERY 6 HOURS
Refills: 0 | Status: DISCONTINUED | OUTPATIENT
Start: 2022-04-19 | End: 2022-04-26

## 2022-04-19 RX ORDER — ACETYLCYSTEINE 200 MG/ML
4 VIAL (ML) MISCELLANEOUS EVERY 6 HOURS
Refills: 0 | Status: DISCONTINUED | OUTPATIENT
Start: 2022-04-19 | End: 2022-04-19

## 2022-04-19 RX ORDER — SODIUM CHLORIDE 9 MG/ML
1000 INJECTION INTRAMUSCULAR; INTRAVENOUS; SUBCUTANEOUS
Refills: 0 | Status: DISCONTINUED | OUTPATIENT
Start: 2022-04-19 | End: 2022-04-19

## 2022-04-19 RX ORDER — CHLORHEXIDINE GLUCONATE 213 G/1000ML
1 SOLUTION TOPICAL
Refills: 0 | Status: DISCONTINUED | OUTPATIENT
Start: 2022-04-19 | End: 2022-04-24

## 2022-04-19 RX ORDER — LEVETIRACETAM 250 MG/1
750 TABLET, FILM COATED ORAL EVERY 12 HOURS
Refills: 0 | Status: DISCONTINUED | OUTPATIENT
Start: 2022-04-19 | End: 2022-04-21

## 2022-04-19 RX ADMIN — PIPERACILLIN AND TAZOBACTAM 25 GRAM(S): 4; .5 INJECTION, POWDER, LYOPHILIZED, FOR SOLUTION INTRAVENOUS at 21:05

## 2022-04-19 RX ADMIN — HEPARIN SODIUM 5000 UNIT(S): 5000 INJECTION INTRAVENOUS; SUBCUTANEOUS at 05:54

## 2022-04-19 RX ADMIN — Medication 3 MILLILITER(S): at 06:08

## 2022-04-19 RX ADMIN — Medication 100 MILLIGRAM(S): at 13:15

## 2022-04-19 RX ADMIN — Medication 100 MILLIEQUIVALENT(S): at 01:16

## 2022-04-19 RX ADMIN — Medication 4 MILLILITER(S): at 06:05

## 2022-04-19 RX ADMIN — Medication 4 MILLILITER(S): at 11:21

## 2022-04-19 RX ADMIN — PIPERACILLIN AND TAZOBACTAM 25 GRAM(S): 4; .5 INJECTION, POWDER, LYOPHILIZED, FOR SOLUTION INTRAVENOUS at 13:15

## 2022-04-19 RX ADMIN — Medication 50 MILLIGRAM(S): at 05:54

## 2022-04-19 RX ADMIN — LEVETIRACETAM 400 MILLIGRAM(S): 250 TABLET, FILM COATED ORAL at 17:05

## 2022-04-19 RX ADMIN — PIPERACILLIN AND TAZOBACTAM 200 GRAM(S): 4; .5 INJECTION, POWDER, LYOPHILIZED, FOR SOLUTION INTRAVENOUS at 01:16

## 2022-04-19 RX ADMIN — LEVETIRACETAM 400 MILLIGRAM(S): 250 TABLET, FILM COATED ORAL at 06:24

## 2022-04-19 RX ADMIN — Medication 100 MILLIEQUIVALENT(S): at 03:19

## 2022-04-19 RX ADMIN — Medication 100 MILLIGRAM(S): at 21:00

## 2022-04-19 RX ADMIN — SENNA PLUS 2 TABLET(S): 8.6 TABLET ORAL at 21:00

## 2022-04-19 RX ADMIN — Medication 20 MILLIGRAM(S): at 08:50

## 2022-04-19 RX ADMIN — Medication 100 MILLIGRAM(S): at 05:53

## 2022-04-19 RX ADMIN — Medication 3 MILLILITER(S): at 17:07

## 2022-04-19 RX ADMIN — Medication 50 MILLIGRAM(S): at 13:16

## 2022-04-19 RX ADMIN — PIPERACILLIN AND TAZOBACTAM 25 GRAM(S): 4; .5 INJECTION, POWDER, LYOPHILIZED, FOR SOLUTION INTRAVENOUS at 05:54

## 2022-04-19 RX ADMIN — Medication 4 MILLILITER(S): at 17:07

## 2022-04-19 RX ADMIN — HEPARIN SODIUM 5000 UNIT(S): 5000 INJECTION INTRAVENOUS; SUBCUTANEOUS at 17:05

## 2022-04-19 RX ADMIN — Medication 3 MILLILITER(S): at 11:18

## 2022-04-19 RX ADMIN — Medication 50 MILLIGRAM(S): at 21:00

## 2022-04-19 RX ADMIN — Medication 100 MILLIEQUIVALENT(S): at 00:23

## 2022-04-19 RX ADMIN — SODIUM CHLORIDE 75 MILLILITER(S): 9 INJECTION INTRAMUSCULAR; INTRAVENOUS; SUBCUTANEOUS at 11:27
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
## 2022-04-19 NOTE — PROGRESS NOTE ADULT - SUBJECTIVE AND OBJECTIVE BOX
SUBJECTIVE: Had tachypnea, tachycardia, was diuresed    Vital Signs Last 24 Hrs  T(C): 37.5 (04-19-22 @ 04:00), Max: 37.6 (04-19-22 @ 02:36)  T(F): 99.5 (04-19-22 @ 04:00), Max: 99.7 (04-19-22 @ 02:36)  HR: 76 (04-19-22 @ 07:00) (53 - 98)  BP: 150/72 (04-19-22 @ 07:00) (131/60 - 184/107)  BP(mean): 100 (04-19-22 @ 07:00) (80 - 129)  RR: 20 (04-19-22 @ 07:00) (17 - 40)  SpO2: 94% (04-19-22 @ 07:00) (89% - 100%)    PHYSICAL EXAM:    Constitutional: No Acute Distress     Neurological: Drowsy oriented x 3, pupils L pupil sluggish dilated but reactive bilateral, follows commands, moves all extremities, Left UE upwards drift, Left Hemianopsia  poss Adie pupil    Incision:     Drains:     Pulmonary: Clear to Auscultation, No rales, No rhonchi, No wheezes     Cardiovascular: S1, S2, Regular rate and rhythm     Gastrointestinal: Soft, Non-tender, Non-distended, +bowel sounds x 4    Extremities: No calf tenderness bilaterally, no cyanosis, clubbing or edema          LABS:                          12.7   12.68 )-----------( 160      ( 18 Apr 2022 21:21 )             40.0    04-18    139  |  102  |  14  ----------------------------<  133<H>  3.7   |  22  |  1.11    Ca    8.9      18 Apr 2022 21:21  Phos  2.1     04-18  Mg     1.8     04-18 04-18 @ 07:01  -  04-19 @ 07:00  --------------------------------------------------------  IN: 1765 mL / OUT: 2250 mL / NET: -485 mL        IMAGING:     MEDICATIONS:  Antibiotics:  piperacillin/tazobactam IVPB.. 3.375 Gram(s) IV Intermittent every 8 hours    Neuro:  acetaminophen     Tablet .. 650 milliGRAM(s) Oral every 6 hours PRN Mild Pain (1 - 3)  levETIRAcetam  IVPB 750 milliGRAM(s) IV Intermittent every 12 hours    Cardiac:  hydrALAZINE 50 milliGRAM(s) Oral every 8 hours  hydrALAZINE Injectable 10 milliGRAM(s) IV Push every 6 hours PRN SBP >160  labetalol 100 milliGRAM(s) Oral three times a day    Pulm:  acetylcysteine 10%  Inhalation 4 milliLiter(s) Inhalation every 6 hours  albuterol/ipratropium for Nebulization 3 milliLiter(s) Nebulizer every 6 hours    GI/:  senna 2 Tablet(s) Oral at bedtime    Other:   chlorhexidine 4% Liquid 1 Application(s) Topical <User Schedule>  heparin   Injectable 5000 Unit(s) SubCutaneous every 12 hours        DIET:

## 2022-04-19 NOTE — PROGRESS NOTE ADULT - SUBJECTIVE AND OBJECTIVE BOX
Patient seen and examined    T(C): 36.9 (04-19-22 @ 16:00), Max: 37.6 (04-19-22 @ 02:36)  HR: 72 (04-19-22 @ 19:00) (59 - 98)  BP: 157/86 (04-19-22 @ 19:00) (131/60 - 184/107)  RR: 18 (04-19-22 @ 19:00) (18 - 40)  SpO2: 93% (04-19-22 @ 19:00) (89% - 100%)  04-18-22 @ 07:01  -  04-19-22 @ 07:00  --------------------------------------------------------  IN: 1765 mL / OUT: 2250 mL / NET: -485 mL    04-19-22 @ 07:01  -  04-19-22 @ 19:32  --------------------------------------------------------  IN: 750 mL / OUT: 775 mL / NET: -25 mL    acetaminophen     Tablet .. 650 milliGRAM(s) Oral every 6 hours PRN  acetylcysteine 10%  Inhalation 4 milliLiter(s) Inhalation every 6 hours  albuterol/ipratropium for Nebulization 3 milliLiter(s) Nebulizer every 6 hours  chlorhexidine 4% Liquid 1 Application(s) Topical <User Schedule>  heparin   Injectable 5000 Unit(s) SubCutaneous every 12 hours  hydrALAZINE 50 milliGRAM(s) Oral every 8 hours  hydrALAZINE Injectable 10 milliGRAM(s) IV Push every 6 hours PRN  labetalol 100 milliGRAM(s) Oral three times a day  levETIRAcetam  IVPB 750 milliGRAM(s) IV Intermittent every 12 hours  piperacillin/tazobactam IVPB.. 3.375 Gram(s) IV Intermittent every 8 hours  senna 2 Tablet(s) Oral at bedtime  sodium chloride 0.9%. 1000 milliLiter(s) IV Continuous <Continuous>        Exam stable    labs and imaging reviewed     Continue same management   would do a short course of zosyn for 5 days     Kait Wilhelm NSCU attending

## 2022-04-19 NOTE — CONSULT NOTE ADULT - NS ATTEND AMEND GEN_ALL_CORE FT
pt with likely contrast nephropathy which has improved   to receive ivf hydration arya procedure with NS as above   strict I/O  hypophosphatemia supplement phos   d/w NISCU team
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
## 2022-04-20 ENCOUNTER — APPOINTMENT (OUTPATIENT)
Dept: NEUROSURGERY | Facility: HOSPITAL | Age: 62
End: 2022-04-20

## 2022-04-20 LAB
ANION GAP SERPL CALC-SCNC: 13 MMOL/L — SIGNIFICANT CHANGE UP (ref 5–17)
ANION GAP SERPL CALC-SCNC: 14 MMOL/L — SIGNIFICANT CHANGE UP (ref 5–17)
APTT BLD: 30.5 SEC — SIGNIFICANT CHANGE UP (ref 27.5–35.5)
BASE EXCESS BLDV CALC-SCNC: 3.3 MMOL/L — HIGH (ref -2–2)
BLD GP AB SCN SERPL QL: NEGATIVE — SIGNIFICANT CHANGE UP
BUN SERPL-MCNC: 17 MG/DL — SIGNIFICANT CHANGE UP (ref 7–23)
BUN SERPL-MCNC: 18 MG/DL — SIGNIFICANT CHANGE UP (ref 7–23)
CA-I SERPL-SCNC: 1.15 MMOL/L — SIGNIFICANT CHANGE UP (ref 1.15–1.33)
CALCIUM SERPL-MCNC: 8.7 MG/DL — SIGNIFICANT CHANGE UP (ref 8.4–10.5)
CALCIUM SERPL-MCNC: 8.9 MG/DL — SIGNIFICANT CHANGE UP (ref 8.4–10.5)
CHLORIDE BLDV-SCNC: 100 MMOL/L — SIGNIFICANT CHANGE UP (ref 96–108)
CHLORIDE SERPL-SCNC: 100 MMOL/L — SIGNIFICANT CHANGE UP (ref 96–108)
CHLORIDE SERPL-SCNC: 102 MMOL/L — SIGNIFICANT CHANGE UP (ref 96–108)
CO2 BLDV-SCNC: 29 MMOL/L — HIGH (ref 22–26)
CO2 SERPL-SCNC: 21 MMOL/L — LOW (ref 22–31)
CO2 SERPL-SCNC: 21 MMOL/L — LOW (ref 22–31)
CREAT SERPL-MCNC: 1.09 MG/DL — SIGNIFICANT CHANGE UP (ref 0.5–1.3)
CREAT SERPL-MCNC: 1.15 MG/DL — SIGNIFICANT CHANGE UP (ref 0.5–1.3)
EGFR: 72 ML/MIN/1.73M2 — SIGNIFICANT CHANGE UP
EGFR: 76 ML/MIN/1.73M2 — SIGNIFICANT CHANGE UP
GAS PNL BLDV: 131 MMOL/L — LOW (ref 136–145)
GAS PNL BLDV: SIGNIFICANT CHANGE UP
GAS PNL BLDV: SIGNIFICANT CHANGE UP
GLUCOSE BLDC GLUCOMTR-MCNC: 108 MG/DL — HIGH (ref 70–99)
GLUCOSE BLDC GLUCOMTR-MCNC: 137 MG/DL — HIGH (ref 70–99)
GLUCOSE BLDV-MCNC: 121 MG/DL — HIGH (ref 70–99)
GLUCOSE SERPL-MCNC: 115 MG/DL — HIGH (ref 70–99)
GLUCOSE SERPL-MCNC: 156 MG/DL — HIGH (ref 70–99)
HCO3 BLDV-SCNC: 28 MMOL/L — SIGNIFICANT CHANGE UP (ref 22–29)
HCT VFR BLD CALC: 40.7 % — SIGNIFICANT CHANGE UP (ref 39–50)
HCT VFR BLD CALC: 40.8 % — SIGNIFICANT CHANGE UP (ref 39–50)
HCT VFR BLDA CALC: 40 % — SIGNIFICANT CHANGE UP (ref 39–51)
HGB BLD CALC-MCNC: 13.3 G/DL — SIGNIFICANT CHANGE UP (ref 12.6–17.4)
HGB BLD-MCNC: 12.9 G/DL — LOW (ref 13–17)
HGB BLD-MCNC: 13 G/DL — SIGNIFICANT CHANGE UP (ref 13–17)
INR BLD: 1.26 RATIO — HIGH (ref 0.88–1.16)
LACTATE BLDV-MCNC: 1.2 MMOL/L — SIGNIFICANT CHANGE UP (ref 0.7–2)
MAGNESIUM SERPL-MCNC: 2 MG/DL — SIGNIFICANT CHANGE UP (ref 1.6–2.6)
MAGNESIUM SERPL-MCNC: 2 MG/DL — SIGNIFICANT CHANGE UP (ref 1.6–2.6)
MCHC RBC-ENTMCNC: 27 PG — SIGNIFICANT CHANGE UP (ref 27–34)
MCHC RBC-ENTMCNC: 27.2 PG — SIGNIFICANT CHANGE UP (ref 27–34)
MCHC RBC-ENTMCNC: 31.7 GM/DL — LOW (ref 32–36)
MCHC RBC-ENTMCNC: 31.9 GM/DL — LOW (ref 32–36)
MCV RBC AUTO: 85.3 FL — SIGNIFICANT CHANGE UP (ref 80–100)
MCV RBC AUTO: 85.4 FL — SIGNIFICANT CHANGE UP (ref 80–100)
NRBC # BLD: 0 /100 WBCS — SIGNIFICANT CHANGE UP (ref 0–0)
NRBC # BLD: 0 /100 WBCS — SIGNIFICANT CHANGE UP (ref 0–0)
PCO2 BLDV: 41 MMHG — LOW (ref 42–55)
PH BLDV: 7.44 — HIGH (ref 7.32–7.43)
PHOSPHATE SERPL-MCNC: 3.1 MG/DL — SIGNIFICANT CHANGE UP (ref 2.5–4.5)
PHOSPHATE SERPL-MCNC: 3.2 MG/DL — SIGNIFICANT CHANGE UP (ref 2.5–4.5)
PLATELET # BLD AUTO: 166 K/UL — SIGNIFICANT CHANGE UP (ref 150–400)
PLATELET # BLD AUTO: 170 K/UL — SIGNIFICANT CHANGE UP (ref 150–400)
PO2 BLDV: 49 MMHG — HIGH (ref 25–45)
POTASSIUM BLDV-SCNC: 4.5 MMOL/L — SIGNIFICANT CHANGE UP (ref 3.5–5.1)
POTASSIUM SERPL-MCNC: 3.8 MMOL/L — SIGNIFICANT CHANGE UP (ref 3.5–5.3)
POTASSIUM SERPL-MCNC: 3.9 MMOL/L — SIGNIFICANT CHANGE UP (ref 3.5–5.3)
POTASSIUM SERPL-SCNC: 3.8 MMOL/L — SIGNIFICANT CHANGE UP (ref 3.5–5.3)
POTASSIUM SERPL-SCNC: 3.9 MMOL/L — SIGNIFICANT CHANGE UP (ref 3.5–5.3)
PROTHROM AB SERPL-ACNC: 14.7 SEC — HIGH (ref 10.5–13.4)
RBC # BLD: 4.77 M/UL — SIGNIFICANT CHANGE UP (ref 4.2–5.8)
RBC # BLD: 4.78 M/UL — SIGNIFICANT CHANGE UP (ref 4.2–5.8)
RBC # BLD: 4.78 M/UL — SIGNIFICANT CHANGE UP (ref 4.2–5.8)
RBC # FLD: 14.9 % — HIGH (ref 10.3–14.5)
RBC # FLD: 14.9 % — HIGH (ref 10.3–14.5)
RETICS #: 43 K/UL — SIGNIFICANT CHANGE UP (ref 25–125)
RETICS/RBC NFR: 0.9 % — SIGNIFICANT CHANGE UP (ref 0.5–2.5)
RH IG SCN BLD-IMP: POSITIVE — SIGNIFICANT CHANGE UP
SAO2 % BLDV: 83.4 % — SIGNIFICANT CHANGE UP (ref 67–88)
SARS-COV-2 RNA SPEC QL NAA+PROBE: SIGNIFICANT CHANGE UP
SODIUM SERPL-SCNC: 134 MMOL/L — LOW (ref 135–145)
SODIUM SERPL-SCNC: 137 MMOL/L — SIGNIFICANT CHANGE UP (ref 135–145)
WBC # BLD: 8.79 K/UL — SIGNIFICANT CHANGE UP (ref 3.8–10.5)
WBC # BLD: 8.92 K/UL — SIGNIFICANT CHANGE UP (ref 3.8–10.5)
WBC # FLD AUTO: 8.79 K/UL — SIGNIFICANT CHANGE UP (ref 3.8–10.5)
WBC # FLD AUTO: 8.92 K/UL — SIGNIFICANT CHANGE UP (ref 3.8–10.5)

## 2022-04-20 PROCEDURE — 36224 PLACE CATH CAROTD ART: CPT | Mod: 50

## 2022-04-20 PROCEDURE — 36226 PLACE CATH VERTEBRAL ART: CPT | Mod: 50

## 2022-04-20 PROCEDURE — 93970 EXTREMITY STUDY: CPT | Mod: 26

## 2022-04-20 PROCEDURE — 95720 EEG PHY/QHP EA INCR W/VEEG: CPT

## 2022-04-20 PROCEDURE — 99232 SBSQ HOSP IP/OBS MODERATE 35: CPT

## 2022-04-20 PROCEDURE — 99233 SBSQ HOSP IP/OBS HIGH 50: CPT

## 2022-04-20 PROCEDURE — 36227 PLACE CATH XTRNL CAROTID: CPT | Mod: 50

## 2022-04-20 RX ORDER — LABETALOL HCL 100 MG
5 TABLET ORAL ONCE
Refills: 0 | Status: COMPLETED | OUTPATIENT
Start: 2022-04-20 | End: 2022-04-20

## 2022-04-20 RX ORDER — POTASSIUM CHLORIDE 20 MEQ
40 PACKET (EA) ORAL ONCE
Refills: 0 | Status: COMPLETED | OUTPATIENT
Start: 2022-04-20 | End: 2022-04-20

## 2022-04-20 RX ORDER — LACOSAMIDE 50 MG/1
200 TABLET ORAL ONCE
Refills: 0 | Status: DISCONTINUED | OUTPATIENT
Start: 2022-04-20 | End: 2022-04-20

## 2022-04-20 RX ORDER — SODIUM CHLORIDE 9 MG/ML
1000 INJECTION INTRAMUSCULAR; INTRAVENOUS; SUBCUTANEOUS
Refills: 0 | Status: DISCONTINUED | OUTPATIENT
Start: 2022-04-20 | End: 2022-04-21

## 2022-04-20 RX ORDER — AMLODIPINE BESYLATE 2.5 MG/1
5 TABLET ORAL DAILY
Refills: 0 | Status: DISCONTINUED | OUTPATIENT
Start: 2022-04-20 | End: 2022-04-26

## 2022-04-20 RX ORDER — LABETALOL HCL 100 MG
200 TABLET ORAL THREE TIMES A DAY
Refills: 0 | Status: DISCONTINUED | OUTPATIENT
Start: 2022-04-20 | End: 2022-04-20

## 2022-04-20 RX ORDER — LABETALOL HCL 100 MG
300 TABLET ORAL THREE TIMES A DAY
Refills: 0 | Status: DISCONTINUED | OUTPATIENT
Start: 2022-04-20 | End: 2022-04-21

## 2022-04-20 RX ORDER — LABETALOL HCL 100 MG
100 TABLET ORAL ONCE
Refills: 0 | Status: COMPLETED | OUTPATIENT
Start: 2022-04-20 | End: 2022-04-20

## 2022-04-20 RX ORDER — LACOSAMIDE 50 MG/1
100 TABLET ORAL EVERY 12 HOURS
Refills: 0 | Status: DISCONTINUED | OUTPATIENT
Start: 2022-04-20 | End: 2022-04-21

## 2022-04-20 RX ADMIN — Medication 50 MILLIGRAM(S): at 05:41

## 2022-04-20 RX ADMIN — Medication 200 MILLIGRAM(S): at 05:38

## 2022-04-20 RX ADMIN — LACOSAMIDE 200 MILLIGRAM(S): 50 TABLET ORAL at 11:04

## 2022-04-20 RX ADMIN — Medication 1 MILLIGRAM(S): at 10:50

## 2022-04-20 RX ADMIN — Medication 40 MILLIEQUIVALENT(S): at 02:24

## 2022-04-20 RX ADMIN — Medication 5 MILLIGRAM(S): at 20:03

## 2022-04-20 RX ADMIN — PIPERACILLIN AND TAZOBACTAM 25 GRAM(S): 4; .5 INJECTION, POWDER, LYOPHILIZED, FOR SOLUTION INTRAVENOUS at 05:40

## 2022-04-20 RX ADMIN — Medication 10 MILLIGRAM(S): at 00:30

## 2022-04-20 RX ADMIN — PIPERACILLIN AND TAZOBACTAM 25 GRAM(S): 4; .5 INJECTION, POWDER, LYOPHILIZED, FOR SOLUTION INTRAVENOUS at 14:38

## 2022-04-20 RX ADMIN — CHLORHEXIDINE GLUCONATE 1 APPLICATION(S): 213 SOLUTION TOPICAL at 05:41

## 2022-04-20 RX ADMIN — Medication 50 MILLIGRAM(S): at 21:36

## 2022-04-20 RX ADMIN — Medication 3 MILLILITER(S): at 11:37

## 2022-04-20 RX ADMIN — CHLORHEXIDINE GLUCONATE 1 APPLICATION(S): 213 SOLUTION TOPICAL at 22:06

## 2022-04-20 RX ADMIN — HEPARIN SODIUM 5000 UNIT(S): 5000 INJECTION INTRAVENOUS; SUBCUTANEOUS at 05:40

## 2022-04-20 RX ADMIN — LACOSAMIDE 120 MILLIGRAM(S): 50 TABLET ORAL at 19:26

## 2022-04-20 RX ADMIN — PIPERACILLIN AND TAZOBACTAM 25 GRAM(S): 4; .5 INJECTION, POWDER, LYOPHILIZED, FOR SOLUTION INTRAVENOUS at 21:35

## 2022-04-20 RX ADMIN — LEVETIRACETAM 400 MILLIGRAM(S): 250 TABLET, FILM COATED ORAL at 05:40

## 2022-04-20 RX ADMIN — Medication 300 MILLIGRAM(S): at 21:32

## 2022-04-20 RX ADMIN — Medication 10 MILLIGRAM(S): at 19:28

## 2022-04-20 RX ADMIN — Medication 100 MILLIGRAM(S): at 02:42

## 2022-04-20 RX ADMIN — Medication 3 MILLILITER(S): at 00:20

## 2022-04-20 RX ADMIN — Medication 5 MILLIGRAM(S): at 02:20

## 2022-04-20 RX ADMIN — SODIUM CHLORIDE 30 MILLILITER(S): 9 INJECTION INTRAMUSCULAR; INTRAVENOUS; SUBCUTANEOUS at 22:34

## 2022-04-20 RX ADMIN — LEVETIRACETAM 400 MILLIGRAM(S): 250 TABLET, FILM COATED ORAL at 18:53

## 2022-04-20 RX ADMIN — Medication 3 MILLILITER(S): at 05:57

## 2022-04-20 RX ADMIN — SENNA PLUS 2 TABLET(S): 8.6 TABLET ORAL at 21:36
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
## 2022-04-20 NOTE — DIETITIAN INITIAL EVALUATION ADULT - PERTINENT MEDS FT
MEDICATIONS  (STANDING)  heparin   Injectable 5000 Unit(s) SubCutaneous every 12 hours  hydrALAZINE 50 milliGRAM(s) Oral every 8 hours  labetalol 200 milliGRAM(s) Oral three times a day  levETIRAcetam  IVPB 750 milliGRAM(s) IV Intermittent every 12 hours  piperacillin/tazobactam IVPB.. 3.375 Gram(s) IV Intermittent every 8 hours  senna 2 Tablet(s) Oral at bedtime    MEDICATIONS  (PRN):  acetaminophen     Tablet .. 650 milliGRAM(s) Oral every 6 hours PRN Mild Pain (1 - 3)  acetylcysteine 10%  Inhalation 4 milliLiter(s) Inhalation every 6 hours PRN congestion  hydrALAZINE Injectable 10 milliGRAM(s) IV Push every 6 hours PRN SBP >160
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
## 2022-04-20 NOTE — DIETITIAN INITIAL EVALUATION ADULT - ENTER FROM (G/KG)
Group Therapy Note    Date: 2/14/2020    Group Start Time: 1430  Group End Time: 5835  Group Topic: Cognitive Skills    [facility] KERMIT Fofana, CTRS        Group Therapy Note    Attendees:5/18    patient refused to attend self esteem group at [id number] after encouragement from staff. 1:1 talk time provided as alternative to group session. 1

## 2022-04-20 NOTE — PROGRESS NOTE ADULT - SUBJECTIVE AND OBJECTIVE BOX
SUBJECTIVE: C/f seizure activity on LUE, on veeg, angio today    Vital Signs Last 24 Hrs  T(C): 37.5 (04-19-22 @ 04:00), Max: 37.6 (04-19-22 @ 02:36)  T(F): 99.5 (04-19-22 @ 04:00), Max: 99.7 (04-19-22 @ 02:36)  HR: 76 (04-19-22 @ 07:00) (53 - 98)  BP: 150/72 (04-19-22 @ 07:00) (131/60 - 184/107)  BP(mean): 100 (04-19-22 @ 07:00) (80 - 129)  RR: 20 (04-19-22 @ 07:00) (17 - 40)  SpO2: 94% (04-19-22 @ 07:00) (89% - 100%)    PHYSICAL EXAM:    Constitutional: No Acute Distress     Neurological: Drowsy oriented x 3, pupils L pupil sluggish dilated but reactive bilateral, follows commands, moves all extremities, Left UE upwards drift, Left Hemianopsia  poss Adie pupil    Incision:     Drains:     Pulmonary: Clear to Auscultation, No rales, No rhonchi, No wheezes     Cardiovascular: S1, S2, Regular rate and rhythm     Gastrointestinal: Soft, Non-tender, Non-distended, +bowel sounds x 4    Extremities: No calf tenderness bilaterally, no cyanosis, clubbing or edema          LABS:                          12.7   12.68 )-----------( 160      ( 18 Apr 2022 21:21 )             40.0    04-18    139  |  102  |  14  ----------------------------<  133<H>  3.7   |  22  |  1.11    Ca    8.9      18 Apr 2022 21:21  Phos  2.1     04-18  Mg     1.8     04-18      I&O's Summary    19 Apr 2022 07:01  -  20 Apr 2022 07:00  --------------------------------------------------------  IN: 1000 mL / OUT: 1425 mL / NET: -425 mL          IMAGING:     MEDICATIONS:  Antibiotics:  piperacillin/tazobactam IVPB.. 3.375 Gram(s) IV Intermittent every 8 hours    Neuro:  acetaminophen     Tablet .. 650 milliGRAM(s) Oral every 6 hours PRN Mild Pain (1 - 3)  levETIRAcetam  IVPB 750 milliGRAM(s) IV Intermittent every 12 hours    Cardiac:  hydrALAZINE 50 milliGRAM(s) Oral every 8 hours  hydrALAZINE Injectable 10 milliGRAM(s) IV Push every 6 hours PRN SBP >160  labetalol 100 milliGRAM(s) Oral three times a day    Pulm:  acetylcysteine 10%  Inhalation 4 milliLiter(s) Inhalation every 6 hours  albuterol/ipratropium for Nebulization 3 milliLiter(s) Nebulizer every 6 hours    GI/:  senna 2 Tablet(s) Oral at bedtime    Other:   chlorhexidine 4% Liquid 1 Application(s) Topical <User Schedule>  heparin   Injectable 5000 Unit(s) SubCutaneous every 12 hours        DIET:

## 2022-04-20 NOTE — PROVIDER CONTACT NOTE (OTHER) - ACTION/TREATMENT ORDERED:
Marquita Watson, made aware, ordered Labetalol 25mg Marquita Watson, NP made aware, ordered Labetalol 25mg

## 2022-04-20 NOTE — ASU PREOP CHECKLIST - SKIN PREP
Kidney Stone (with Pain)    The sharp cramping pain on either side of your lower back and nausea/vomiting that you have are because ofÂ a small stone that has formed in the kidney. It is now passing down a narrow tube (ureter) on its way to your bladder. Once the stone reaches your bladder, the pain will often stop. But it may come back as the stone continues to pass out of the bladder and through the urethra. The stone may pass in your urine stream in one piece. The size may be 1/16 inch to 1/4 inch (1 to 6 mm). Or, the stone may break up into avis fragments that you may not even notice. Once you have had a kidney stone, you are at risk of getting another one in the future. There are 4 types of kidney stones. Eighty percent are calcium stones--mostly calcium oxalate but also some with calcium phosphate. The other 3 types include uric acid stones, struvite stones (from a preceding infection), and rarely, cystine stones. Most stones will pass on their own, but may take from a few hours to a few days. Sometimes the stone is too large to pass by itself. In that case, the health care provider will need to useÂ other ways to remove the stone. These techniques include:  Â· Lithotripsy. ThisÂ uses ultrasound waves to break up the stone. Â· Ureteroscopy. ThisÂ pushes a basket-like instrument through the urethra and bladder and into the ureter to pull out the stone. Â· Various types of direct surgery through the skin  Home care  The following are general care guidelines:  Â· Drink plenty of fluids. This means at least 12, 8-ounce glasses of fluid--mostly water--a day. Â· Each time you urinate, do so in a jar. Pour the urine from the jar through the strainer and into the toilet. Continue doing this until 24 hours after your pain stops. By then, if there was a kidney stone, it should pass from your bladder. Some stones dissolve into sand-like particles and pass right through the strainer.  In that case, you wonât ever see a stone.  Â· Save any stone that you find in the strainer and bring it to your doctor to look at. It may be possible to stop certain types of stones from forming. For this reason, it is important to know what kind of stone you have. Â· Try to stay as active as possible. This will help the stone pass. Don't stay in bed unless your pain keeps you from getting up. You may notice a red, pink, or brown color to your urine. This is normal while passing a kidney stone. Â· If you develop pain, you may take ibuprofen or naproxen for pain, unless another medicine was prescribed. Â If you have chronic liver or kidney disease, talk with yourÂ health care provider before taking these medicines. Also talk with your provider if you'veÂ had a stomach ulcer or GI bleeding. PreventingÂ stones  Each year for the next 5 toÂ 7 years,Â you are at risk thatÂ aÂ new stone will form. Your risk is a 50% chance over this time period. Â The risk is higherÂ if you have a family history of kidney stones or have certain chronic illnesses like hypertension, obesity, orÂ diabetes. Â But you can makeÂ changes to your lifestyle and diet that can lower yourÂ risk for another stone. Most kidney stones are made of calcium. The following is advice for preventing anotherÂ calcium stone. Â If you donât know the type of stone you have, follow this advice until the cause of your stone is found. Things that help:  Â· The most important thing you can do is to drink plenty of fluids each day. See home care above. Â   Â· Eat foods that contain phytates. These includeÂ wheat, rice, rye, barley, and beans. Phytates are substances that may lower your risk forÂ any type of stone for form. Â· Eat more fruits and vegetables. Â Choose those that areÂ high in potassium. Â· Eat foods high in natural citrate likeÂ fruit and low-sugar fruit juices. Â· Having too little calcium in your diet can put you at risk forÂ calciumÂ kidney stones.  Eat a normal amount of calcium in your diet and talkÂ with your health care providerÂ if you are taking calcium supplements. Cutting back on your calcium intake may raise your risk. Â New research shows that eating calcium-rich and oxalate-rich foods together lowers your risk for stones by binding the minerals in the stomach and intestines before they can reach the kidneys. Â   Â· Limit salt intake to 2Â grams [employer]) per day. Use limited amounts when cooking, and donât add salt at the table. Â Processed and canned foods are usually high in salt. Â   Â· Spinach, rhubarb, peanuts, cashews, almonds, grapefruit, and grapefruit juice are all high oxalate foods. You should limit how much of these you eat. OrÂ eat them withÂ calcium-rich foods. These include dairy products, dark leafy greens, soy products, and calcium-enriched foods. Â· Reducing the amount of animal meat and high protein foods in your diet may lower your riskÂ of uric acid stones. Â· AvoidÂ excess sugar (sucrose) and fructose (sweetener in many soft drinks) inÂ yourÂ diet. Â   Â· If you take vitamin C as a supplement, don't take more than 1,000 mg a day. Â· A dietitian or your health provider can give youÂ information aboutÂ changes in your diet that will help stop moreÂ kidney stone from forming. Follow-up care  Follow up with yourÂ health care provider, or as advised,Â if the pain lasts more than 48 hours. Talk with your provider about urine and blood tests to find out the cause of your stone. If you had an X-ray, CT scan, or other diagnostic test, it will be looked atÂ by a specialist. Mukund Elsa will be told of any new findings that may affect your care.   When to seek medical advice  Call your health care provider right away if any of these occur:  Â· Pain that is not controlled by the medicine given  Â· Repeated vomiting or unable to keep down fluids  Â· Weakness, dizziness, or fainting  Â· Fever of 100.4ÂºF (38ÂºC) or higher, or as directed by your health care provider  Â· Passage of solid red or brown urine (can't see through it) or urine with lots of blood clots  Â· Foul-smelling or cloudy urine  Â· Unable to pass urine for 8 hours and increasing bladder pressure  Â© 2929-0840 [address], [facility] [address] All rights reserved. This information is not intended as a substitute for professional medical care. Always follow your healthcare professional's instructions. done

## 2022-04-20 NOTE — PROGRESS NOTE ADULT - SUBJECTIVE AND OBJECTIVE BOX
Patient seen and examined at bedside.    --Anticoagulation--  heparin   Injectable 5000 Unit(s) SubCutaneous every 12 hours    T(C): 37 (04-20-22 @ 00:00), Max: 37.1 (04-19-22 @ 20:00)  HR: 61 (04-20-22 @ 04:00) (59 - 91)  BP: 168/101 (04-20-22 @ 04:00) (135/76 - 190/88)  RR: 23 (04-20-22 @ 04:00) (16 - 28)  SpO2: 94% (04-20-22 @ 04:00) (92% - 98%)  Wt(kg): --    Exam:  Awake, Ox3, FC, Rt pupil 3mm, Lt pupil 4mm sluggish, LHH, Rt sensory drift, Rt sided 5/5, Lt side 4+/5 w/ neglect

## 2022-04-20 NOTE — DIETITIAN INITIAL EVALUATION ADULT - ORAL INTAKE PTA/DIET HISTORY
Patient reports good appetite, consuming 2x meals/day including all food groups. Follows no therapeutic restrictions, confirms NKFA. States use of multivitamin and no oral nutritional supplements. Per SLP evaluation (4/19), recommends easy to chew diet with mildly-thickened liquids.

## 2022-04-20 NOTE — DIETITIAN INITIAL EVALUATION ADULT - ADD RECOMMEND
1. When medically feasible, recommend consistent carbohydrate and DASH/TLC. Continue to monitor for need of oral nutritional supplements. Defer diet texture to team/SLP   2. Recommend multivitamin if not medically contraindicated   3. Continue to monitor labs, skin integrity, weight, GI distress, intake and tolerance   4. Provide education as needed/able

## 2022-04-20 NOTE — CHART NOTE - NSCHARTNOTESELECT_GEN_ALL_CORE
VTE Risk Assessment/Event Note
EEG/Epilepsy
Interventional Neuro Radiology/Event Note
Interventional Neuro Radiology/Event Note
Speech and Swallow
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
## 2022-04-20 NOTE — PROGRESS NOTE ADULT - ASSESSMENT
62 yo M w HTN presents w R parietal ICH    NEURO:    q2h neuro checks  angio today  stroke following  CTH  stable  veeg fu read  MRI w wo reviewed  Keppra 750 mg BID  Tylenol  Stroke core measures     CARDS:    -160  on hydralazine 50 TID and labetalol 100 tid  TTE severe LVH   bnp 1822 will cont diuresis  ECG LVH, sinus arrhythmia    PULM:  has RML infiltrate, s/p lasix  CPT  RA/NC  repeat CXR today  procal neg  sat > 92%    RENAL:   remains IVL going for angio given episode of hypoxia  Cr 1.1 improving, poss contrast induced cont fluids  BMP daily    GASTRO:    soft diet, npo for angio, resume after  Bowel regimen   LBM PTA    HEME:     DVT prophylaxis: SCDs, SQH     ENDO:  a1c 6.2  BG goal 140-180 mg/dl    ID:  zosyn 5 D course for poss PNA  Monitor for fever   FU blood cultures    Code status:  Full code  Disposition:  ICU vs stroke

## 2022-04-20 NOTE — DIETITIAN INITIAL EVALUATION ADULT - OTHER INFO
Patient has been NPO since 4/18, NPO today for plan angio 4/20. Previously on DASH/TLC, easy to chew with mildly-thick liquids (4/17-4/18) - per RN, consumed 100% of meals.     Weight: Reports UBW of 160-170 pounds as of ~1 week ago, endorses weight loss in-house. Dosing weight: 220 pounds (4/16) with no other documentation - ?accuracies due to discrepancy and patient noted only with mild edema. Will continue to monitor as able     Nutrition-related concerns  - Hyponatremia, s/p sodium chloride 0.9% (4/19)  - Noted with HbA1c of 6.2% (4/17), suggesting pre-diabetes; patient denies any history of diabetes. Briefly discussed importance of avoiding concentrated sugars and including protein during meals. Defer further education regarding DM, low-sodium and heart-healthy fats when appropriate.    Patient has been NPO since 4/18, NPO today for plan angio 4/20. Previously on DASH/TLC, easy to chew with mildly-thick liquids (4/17-4/18) - per RN, consumed 100% of meals.     Weight: Reports UBW of 160-170 pounds as of ~1 week ago, endorses weight loss in-house. Dosing weight: 220 pounds (4/16) with no other documentation - ?accuracies due to discrepancy and patient noted only with mild edema. Will continue to monitor as able     Nutrition-related concerns  - Hyponatremia, s/p sodium chloride 0.9% (4/19)  - Noted with HbA1c of 6.2% (4/17); patient denies any history of diabetes. Briefly discussed importance of avoiding concentrated sugars and including protein during meals. Defer further education regarding DM, low-sodium and heart-healthy fats when appropriate.

## 2022-04-20 NOTE — DIETITIAN INITIAL EVALUATION ADULT - PERTINENT LABORATORY DATA
04-19    134<L>  |  100  |  17  ----------------------------<  115<H>  3.8   |  21<L>  |  1.09    Ca    8.7      19 Apr 2022 23:43  Phos  3.1     04-19  Mg     2.0     04-19    POCT Blood Glucose.: 137 mg/dL (04-20-22 @ 06:57)  A1C with Estimated Average Glucose Result: 6.2 % (04-17-22 @ 08:09)

## 2022-04-20 NOTE — CHART NOTE - NSCHARTNOTEFT_GEN_A_CORE
Interventional Neuro- Radiology   Procedure Note      Procedure: Selective Cerebral Angiography   Pre- Procedure Diagnosis:  Post- Procedure Diagnosis:    : MD Aristides    Physician Assistant: Radha Richter PA-C    RN:  Tech:    Anesthesia: (MAC)   (general anesthesia)    I/Os:  Fluids:  Jesus:  Contrast:  Estimated Blood Loss: <10cc    Preliminary Report:  Under MAC/ general anesthesia, using a ___Fr short/long sheath to the right/ left/ bilateral groin examination of left vertebral artery/ left internal carotid artery/ left external carotid artey/ right vertebral artery/ right internal carotid artery/ right external carotid artery via selective cerebral angiography demonstrates ________. ( Official note to follow).    Patient tolerated procedure well, vital signs stable, hemodynamically stable, no change in neurological status compared to baseline. Results discussed with neurosurgery/ patient and their family. Groin sheath d/c'ed, manual compression held to hemostasis, no active bleeding, no hematoma, vascade closure device applied, quick clot and safeguard balloon dressing applied at _____h. Patient transferred to PACU/ NSCU/ IR recovery for further care/ monitoring. Interventional Neuro- Radiology   Procedure Note      Procedure: Selective Cerebral Angiography   Pre- Procedure Diagnosis: R parietal ICH  Post- Procedure Diagnosis: negative for source of hemorrhage; findings consistent with intracranial atherosclerosis    : Dr. Poli MD  Fellow: Dr. Herrera  Physician Assistant: Radha Richter PA-C    RN: Caro  Tech: Juma/ Yves    Anesthesia: Dr. Serafin MD (MAC)    I/Os:  Fluids: 100 cc  Jesus: DTV  Contrast: 102 cc  Estimated Blood Loss: <10cc    Preliminary Report:  Under MAC, using a 6Fr 75 destination sheath to the right groin examination of left vertebral artery/ left internal carotid artery/ left external carotid artery/ right vertebral artery/ right internal carotid artery/ right external carotid artery via selective cerebral angiography demonstrates negative for source of hemorrhage; findings consistent with intracranial atherosclerosis. (Official note to follow).    Patient tolerated procedure well, vital signs stable, hemodynamically stable, no change in neurological status compared to baseline. Results discussed with neurosurgery/ patient and their family. Groin sheath d/c'ed, manual compression held to hemostasis, no active bleeding, no hematoma, vascade closure device applied, quick clot and safeguard balloon dressing applied at 18:10h. Patient transferred to MICU for further care/ monitoring.    Radha Richter PA-C Interventional Neuro- Radiology   Procedure Note      Procedure: Selective Cerebral Angiography   Pre- Procedure Diagnosis: R parietal ICH  Post- Procedure Diagnosis: negative for source of hemorrhage; multifocal diffuse beading; R distal MCA branch occlusion and prominent capillary blush; findins consistent with intracranal atherosclerosis vs. vasculitis    : Dr. Poli MD  Fellow: Dr. Herrera  Physician Assistant: Radha Richter PA-C    RN: Caro  Tech: Juma/ Yves    Anesthesia: Dr. Serafin MD (MAC)    I/Os:  Fluids: 100 cc  Jesus: DTV  Contrast: 102 cc  Estimated Blood Loss: <10cc    Preliminary Report:  Under MAC, using a 6Fr 75 destination sheath to the right groin examination of left vertebral artery/ left internal carotid artery/ left external carotid artery/ right vertebral artery/ right internal carotid artery/ right external carotid artery via selective cerebral angiography demonstrates negative for source of hemorrhage; multifocal diffuse beading; R distal MCA branch occlusion and prominent capillary blush; findins consistent with intracranal atherosclerosis vs. vasculitis. (Official note to follow).    Patient tolerated procedure well, vital signs stable, hemodynamically stable, no change in neurological status compared to baseline. Results discussed with neurosurgery/ patient and their family. Groin sheath d/c'ed, manual compression held to hemostasis, no active bleeding, no hematoma, vascade closure device applied, quick clot and safeguard balloon dressing applied at 18:10h. Patient transferred to MICU for further care/ monitoring.    Radha Richter PA-C
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
## 2022-04-20 NOTE — EEG REPORT - NS EEG TEXT BOX
[facility]   COMPREHENSIVE EPILEPSY CENTER   REPORT OF CONTINUOUS VIDEO EEG     [facility]: [address], [place] [address], Ph#: [phone]  [facility]: [id number] [place] [address], Ph#: [phone]  [facility]: [address] [place] [address], Ph#: [phone]    Patient Name: JEANCLAUDE LECONTE  Age and : 61y ([birth year])  MRN #: [id number]  [facility] [place] [address] [facility]  Referring Physician: Elizabeth Jennings    Start Time/Date: 05:30 on 22  End Time/Date: 08:00 on 22  Duration: 02 hours 17 mins  _____________________________________________________________  STUDY INFORMATION    EEG Recording Technique:  The patient underwent continuous Video-EEG monitoring, using Telemetry System hardware on the XLTek Digital System. EEG and video data were stored on a computer hard drive with important events saved in digital archive files. The material was reviewed by a physician (electroencephalographer / epileptologist) on a daily basis. Minor and seizure detection algorithms were utilized and reviewed. An EEG Technician attended to the patient, and was available throughout daytime work hours.  The epilepsy center neurologist was available in person or on call 24-hours per day.    EEG Placement and Labeling of Electrodes:  The EEG was performed utilizing 20 channel referential EEG connections (coronal over temporal over parasagittal montage) using all standard 10-20 electrode placements with EKG, with additional electrodes placed in the inferior temporal region using the modified 10-10 montage electrode placements for elective admissions, or if deemed necessary. Recording was at a sampling rate of 256 samples per second per channel. Time synchronized digital video recording was done simultaneously with EEG recording. A low light infrared camera was used for low light recording.     _____________________________________________________________  HISTORY    Patient is a 61y old  Male who presents with a chief complaint of ICH (2022 07:09)    PERTINENT MEDICATION:  levETIRAcetam  IVPB 750 milliGRAM(s) IV Intermittent every 12 hours  _____________________________________________________________  STUDY INTERPRETATION    Findings: The background was continuous and reactive. During wakefulness, the posterior dominant rhythm consisted of asymmetric well-modulated 8 Hz activity, best seen over the left, with amplitude to 30 uV, that attenuated to eye opening.      Background Slowing:  Intermittent diffuse irregular theta/delta activity.    Focal Slowing:   Continuous polymorphic theta/delta slowing over the right hemisphere, parietocentral max.    Sleep Background:  Drowsiness was characterized by fragmentation, attenuation, and slowing of the background activity.    Sleep was characterized by the presence of vertex waves, asymmetric sleep spindles and K-complexes, better seen over the left.    Other Non-Epileptiform Findings:  None were present.    Interictal Epileptiform Activity:   None were present.    Events:  Seizures: There are frequent (q15-20 mins) focal seizures from right parieto-central region.   EEG: Seizure onset is not quite discreet. There appears to be a mix of theta/beta activity that over C4/P4/Cz/Pz, evolving to rhythmical theta    Activation Procedures:   Hyperventilation was not performed.    Photic stimulation was performed and did not elicit any abnormality. Photic driving was seen.    Artifacts:  Intermittent myogenic and movement artifacts were noted.    ECG:  The heart rate on single channel ECG was predominantly between 60-80 BPM.    _____________________________________________________________  EEG SUMMARY/CLASSIFICATION    Abnormal EEG in the awake, drowsy and asleep states.    - Continuous polymorphic theta/delta slowing over the right hemisphere, parietocentral max.  - Background slowing, generalized.    _____________________________________________________________  EEG IMPRESSION/CLINICAL CORRELATE    Abnormal EEG study.    -   - Structural / functional abnormality in the right hemisphere, parietocentral max.   - Mild nonspecific diffuse or multifocal cerebral dysfunction.   - No epileptiform patterns or seizures were recorded.    _____________________________________________________________    Wale Sullivan MD, ADALGISA  Fellow, [facility] Epilepsy [place]         [facility]   COMPREHENSIVE EPILEPSY CENTER   REPORT OF CONTINUOUS VIDEO EEG     [place]: [address], [place] [address], Ph#: [phone]  [facility]: [id number] [place] [address], Ph#: [phone]  [facility]: [address] [place] [address], Ph#: [phone]    Patient Name: JEANCLAUDE LECONTE  Age and : 61y ([birth year])  MRN #: [id number]  [facility] [place] [address] [facility]  Referring Physician: Elizabeth Jennings    Start Time/Date: 05:30 on 22  End Time/Date: 08:00 on 22  Duration: 02 hours 17 mins  _____________________________________________________________  STUDY INFORMATION    EEG Recording Technique:  The patient underwent continuous Video-EEG monitoring, using Telemetry System hardware on the XLTek Digital System. EEG and video data were stored on a computer hard drive with important events saved in digital archive files. The material was reviewed by a physician (electroencephalographer / epileptologist) on a daily basis. Minor and seizure detection algorithms were utilized and reviewed. An EEG Technician attended to the patient, and was available throughout daytime work hours.  The epilepsy center neurologist was available in person or on call 24-hours per day.    EEG Placement and Labeling of Electrodes:  The EEG was performed utilizing 20 channel referential EEG connections (coronal over temporal over parasagittal montage) using all standard 10-20 electrode placements with EKG, with additional electrodes placed in the inferior temporal region using the modified 10-10 montage electrode placements for elective admissions, or if deemed necessary. Recording was at a sampling rate of 256 samples per second per channel. Time synchronized digital video recording was done simultaneously with EEG recording. A low light infrared camera was used for low light recording.     _____________________________________________________________  HISTORY    Patient is a 61y old  Male who presents with a chief complaint of ICH (2022 07:09)    PERTINENT MEDICATION:  levETIRAcetam  IVPB 750 milliGRAM(s) IV Intermittent every 12 hours  _____________________________________________________________  STUDY INTERPRETATION    Findings: The background was continuous and reactive. During wakefulness, the posterior dominant rhythm consisted of asymmetric well-modulated 8 Hz activity, best seen over the left, with amplitude to 30 uV, that attenuated to eye opening.      Background Slowing:  Intermittent diffuse irregular theta/delta activity.    Focal Slowing:   Continuous polymorphic theta/delta slowing over the right hemisphere, parietocentral max.    Sleep Background:  Drowsiness was characterized by fragmentation, attenuation, and slowing of the background activity.    Sleep was characterized by the presence of vertex waves, asymmetric sleep spindles and K-complexes, better seen over the left.    Other Non-Epileptiform Findings:  None were present.    Interictal Epileptiform Activity:   None were present.    Seizures: There are frequent (7-9 per hour) focal seizures from right parieto-central region.   EEG: Seizure onset is not quite discreet. There appears to be a mix of theta/beta activity that over C4/P4/Cz/Pz, evolving to rhythmical 3-4Hz theta/delta with overirding faster activity, before abrupt offset, followed by 1-2 second of diffuse electrical attenuation. Approximate duration (30-50 seconds).  Clinical: Beginning of seizures appear clinically bland with no apparent change on video. However, 4-7 seconds prior to electrographic offset, there is LUE clonic activity seen with patient appearing uncomfortable and adjusting his LUE repeatedly. In one instance, pt had extension of LUE followed by jerks after seizure onset.    Activation Procedures:   Hyperventilation was not performed.    Photic stimulation was performed and did not elicit any abnormality. Photic driving was seen.    Artifacts:  Intermittent myogenic and movement artifacts were noted.    ECG:  The heart rate on single channel ECG was predominantly between 60-80 BPM.    _____________________________________________________________  EEG SUMMARY/CLASSIFICATION    Abnormal EEG in the awake, drowsy and asleep states.    - Frequent focal seizures from right parietocentral region, as described above.  - Continuous polymorphic theta/delta slowing over the right hemisphere, parietocentral max.  - Background slowing, generalized.    _____________________________________________________________  EEG IMPRESSION/CLINICAL CORRELATE    Abnormal EEG study.    - Focal status epilepticus from right parietocentral region, at times associated with LUE clonic jerks.  - Structural / functional abnormality in the right hemisphere, parietocentral max.   - Mild nonspecific diffuse or multifocal cerebral dysfunction.     d/w Neuroicrit fellow Dr. Greenberg.   _____________________________________________________________    Wale Sullivan MD, ADALGISA  Fellow, [facility] Epilepsy [place]         [facility]   COMPREHENSIVE EPILEPSY CENTER   REPORT OF CONTINUOUS VIDEO EEG     [facility]: [address], [place] [address], Ph#: [phone]  [facility]: [phone] [place] [address], Ph#: [phone]  [facility]: [address] [place] [address], Ph#: [phone]    Patient Name: JEANCLAUDE LECONTE  Age and : 61y ([birth year])  MRN #: [id number]  [facility] [place] [address] [facility]  Referring Physician: Elizabeth Jennings    Start Time/Date: 05:30 on 22  End Time/Date: 08:00 on 22  Duration: 02 hours 17 mins  _____________________________________________________________  STUDY INFORMATION    EEG Recording Technique:  The patient underwent continuous Video-EEG monitoring, using Telemetry System hardware on the XLTek Digital System. EEG and video data were stored on a computer hard drive with important events saved in digital archive files. The material was reviewed by a physician (electroencephalographer / epileptologist) on a daily basis. Minor and seizure detection algorithms were utilized and reviewed. An EEG Technician attended to the patient, and was available throughout daytime work hours.  The epilepsy center neurologist was available in person or on call 24-hours per day.    EEG Placement and Labeling of Electrodes:  The EEG was performed utilizing 20 channel referential EEG connections (coronal over temporal over parasagittal montage) using all standard 10-20 electrode placements with EKG, with additional electrodes placed in the inferior temporal region using the modified 10-10 montage electrode placements for elective admissions, or if deemed necessary. Recording was at a sampling rate of 256 samples per second per channel. Time synchronized digital video recording was done simultaneously with EEG recording. A low light infrared camera was used for low light recording.     _____________________________________________________________  HISTORY    Patient is a 61y old  Male who presents with a chief complaint of ICH (2022 07:09)    PERTINENT MEDICATION:  levETIRAcetam  IVPB 750 milliGRAM(s) IV Intermittent every 12 hours  _____________________________________________________________  STUDY INTERPRETATION    Findings: The background was continuous and reactive. During wakefulness, the posterior dominant rhythm consisted of asymmetric well-modulated 8 Hz activity, best seen over the left, with amplitude to 30 uV, that attenuated to eye opening.      Background Slowing:  Intermittent diffuse irregular theta/delta activity.    Focal Slowing:   Continuous polymorphic theta/delta slowing over the right hemisphere, parietocentral max.    Sleep Background:  Drowsiness was characterized by fragmentation, attenuation, and slowing of the background activity.    Sleep was characterized by the presence of vertex waves, asymmetric sleep spindles and K-complexes, better seen over the left.    Other Non-Epileptiform Findings:  None were present.    Interictal Epileptiform Activity:   None were present.    Seizures: There are frequent (7-9 per hour) focal seizures from right parieto-central region.   EEG: Right parasagittal (C4/P4/Cz/Pz ) rhythmic theta  to rhythmical 3-4Hz delta with overriding faster activity, before abrupt offset, followed by 1-2 second of diffuse electrical attenuation. Approximate duration 30 seconds.  Clinical: Beginning of seizures with no apparent change on video. However, 4-7 seconds prior to electrographic offset, there is LUE clonic activity seen with patient appearing uncomfortable and adjusting his LUE repeatedly. In one instance, pt had extension of LUE followed by jerks after seizure onset.    Activation Procedures:   Hyperventilation was not performed.    Photic stimulation was performed and did not elicit any abnormality. Photic driving was seen.    Artifacts:  Intermittent myogenic and movement artifacts were noted.    ECG:  The heart rate on single channel ECG was predominantly between 60-80 BPM.    _____________________________________________________________  EEG SUMMARY/CLASSIFICATION    Abnormal EEG in the awake, drowsy and asleep states.  - Frequent focal seizures from right parasagittal region, as described above.  - Continuous polymorphic theta/delta slowing over the right hemisphere, parietocentral max.  - Background slowing, generalized.    _____________________________________________________________  EEG IMPRESSION/CLINICAL CORRELATE    Abnormal EEG study.  - Frequent focal right parasagittal seizures associated with LUE clonic jerks as described above.   - Structural / functional abnormality in the right hemisphere, parietocentral max.   - Mild nonspecific diffuse or multifocal cerebral dysfunction.     d/w Neuroicritical care fellow Dr. Greenberg.   _____________________________________________________________    Wale Sullivan MD, ADALGISA  Fellow, [facility] Epilepsy [place]

## 2022-04-20 NOTE — CHART NOTE - NSCHARTNOTEFT_GEN_A_CORE
[facility] COMPREHENSIVE EPILEPSY CENTER    ** PRELIMINARY EEG reviewed until  13:30.    - Frequent seizures from R parasaggital region since beginning of recording at 8am with marked improvement in ictal burden after 1mg Ativan and 200 IVPB Vimpat.  - No discrete seizure after 12:15 so far.     Final report to be produced after completion of the study tomorrow morning.    -----------------------------  Wale Sullivan MD, ADALGISA  Epilepsy Fellow    --------------------------------  EEG Reading Room: [phone]  (weekdays)  On Call Service After Hours: [phone] [facility] COMPREHENSIVE EPILEPSY CENTER    ** PRELIMINARY EEG reviewed until  13:30.    - Frequent seizures from R parasaggital region since beginning of recording at 8am with marked improvement in ictal burden after 1mg Ativan and 200 IVPB Vimpat.  - No discrete seizure after 12:15 so far.     Final report to be produced after completion of the study tomorrow morning.    -----------------------------  Wale Sullivan MD, ADALGISA  Epilepsy Fellow  --------------------------------  EEG Reading Room: [phone]  (weekdays)  On Call Service After Hours: [phone]

## 2022-04-20 NOTE — PROGRESS NOTE ADULT - ASSESSMENT
60 yo M w HTN presents w R parietal ICH    NEURO:    q2h neuro checks  angio today  stroke following  CTH  stable  veeg fu read  MRI w wo reviewed  Keppra 750 mg BID  Tylenol  Stroke core measures     CARDS:    -160  on hydralazine 50 TID and labetalol 100 tid  TTE severe LVH   bnp 1822 will cont diuresis  ECG LVH, sinus arrhythmia    PULM:  has RML infiltrate, s/p lasix  CPT  RA/NC  repeat CXR today  procal neg  sat > 92%    RENAL:   remains IVL going for angio given episode of hypoxia  Cr 1.1 improving, poss contrast induced cont fluids  BMP daily    GASTRO:    soft diet, npo for angio, resume after  Bowel regimen   LBM PTA    HEME:     DVT prophylaxis: SCDs, SQH     ENDO:  a1c 6.2  BG goal 140-180 mg/dl    ID:  zosyn 5 D course for poss PNA  Monitor for fever   FU blood cultures    Code status:  Full code  Disposition:  ICU vs stroke

## 2022-04-20 NOTE — PROGRESS NOTE ADULT - ASSESSMENT
61M s/ R parietal-occipital iph stable on exam, mri c/f prior htn heme, pending angio today prior to xfer to stroke.   - preop for angio today  - q2h neuro checks  - stroke core measures  - primary [place] [facility]
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
## 2022-04-21 LAB
ANION GAP SERPL CALC-SCNC: 13 MMOL/L — SIGNIFICANT CHANGE UP (ref 5–17)
B2 MICROGLOB SERPL-MCNC: 2.5 MG/L — HIGH (ref 0.8–2.2)
BASE EXCESS BLDV CALC-SCNC: 2.8 MMOL/L — HIGH (ref -2–2)
BUN SERPL-MCNC: 19 MG/DL — SIGNIFICANT CHANGE UP (ref 7–23)
CA-I SERPL-SCNC: 1.2 MMOL/L — SIGNIFICANT CHANGE UP (ref 1.15–1.33)
CALCIUM SERPL-MCNC: 9 MG/DL — SIGNIFICANT CHANGE UP (ref 8.4–10.5)
CHLORIDE BLDV-SCNC: 103 MMOL/L — SIGNIFICANT CHANGE UP (ref 96–108)
CHLORIDE SERPL-SCNC: 102 MMOL/L — SIGNIFICANT CHANGE UP (ref 96–108)
CO2 BLDV-SCNC: 28 MMOL/L — HIGH (ref 22–26)
CO2 SERPL-SCNC: 22 MMOL/L — SIGNIFICANT CHANGE UP (ref 22–31)
CREAT SERPL-MCNC: 1.15 MG/DL — SIGNIFICANT CHANGE UP (ref 0.5–1.3)
CRP SERPL-MCNC: 62 MG/L — HIGH (ref 0–4)
DAT C3-SP REAG RBC QL: NEGATIVE — SIGNIFICANT CHANGE UP
DAT POLY-SP REAG RBC QL: POSITIVE — SIGNIFICANT CHANGE UP
DRVVT SCREEN TO CONFIRM RATIO: ABNORMAL
EBV EA AB SER IA-ACNC: <5 U/ML — SIGNIFICANT CHANGE UP
EBV EA AB TITR SER IF: POSITIVE
EBV EA IGG SER-ACNC: NEGATIVE — SIGNIFICANT CHANGE UP
EBV NA IGG SER IA-ACNC: >600 U/ML — HIGH
EBV PATRN SPEC IB-IMP: SIGNIFICANT CHANGE UP
EBV VCA IGG AVIDITY SER QL IA: POSITIVE
EBV VCA IGM SER IA-ACNC: <10 U/ML — SIGNIFICANT CHANGE UP
EBV VCA IGM SER IA-ACNC: >750 U/ML — HIGH
EBV VCA IGM TITR FLD: NEGATIVE — SIGNIFICANT CHANGE UP
EGFR: 72 ML/MIN/1.73M2 — SIGNIFICANT CHANGE UP
ERYTHROCYTE [SEDIMENTATION RATE] IN BLOOD: 64 MM/HR — HIGH (ref 0–20)
FERRITIN SERPL-MCNC: 106 NG/ML — SIGNIFICANT CHANGE UP (ref 30–400)
GAS PNL BLDV: 134 MMOL/L — LOW (ref 136–145)
GAS PNL BLDV: SIGNIFICANT CHANGE UP
GAS PNL BLDV: SIGNIFICANT CHANGE UP
GLUCOSE BLDV-MCNC: 160 MG/DL — HIGH (ref 70–99)
GLUCOSE SERPL-MCNC: 158 MG/DL — HIGH (ref 70–99)
HAPTOGLOB SERPL-MCNC: 281 MG/DL — HIGH (ref 34–200)
HAV IGM SER-ACNC: SIGNIFICANT CHANGE UP
HBV CORE IGM SER-ACNC: SIGNIFICANT CHANGE UP
HBV SURFACE AG SER-ACNC: SIGNIFICANT CHANGE UP
HCO3 BLDV-SCNC: 27 MMOL/L — SIGNIFICANT CHANGE UP (ref 22–29)
HCT VFR BLDA CALC: 39 % — SIGNIFICANT CHANGE UP (ref 39–51)
HCV AB S/CO SERPL IA: 0.12 S/CO — SIGNIFICANT CHANGE UP (ref 0–0.99)
HCV AB SERPL-IMP: SIGNIFICANT CHANGE UP
HERPES SIMPLEX VIRUS 1/2 SURVEILLANCE PCR RESULT: SIGNIFICANT CHANGE UP
HERPES SIMPLEX VIRUS 1/2 SURVEILLANCE PCR SOURCE: SIGNIFICANT CHANGE UP
HGB BLD CALC-MCNC: 13 G/DL — SIGNIFICANT CHANGE UP (ref 12.6–17.4)
HIV 1+2 AB+HIV1 P24 AG SERPL QL IA: SIGNIFICANT CHANGE UP
HOROWITZ INDEX BLDV+IHG-RTO: 28 — SIGNIFICANT CHANGE UP
HSV1+2 DNA SPEC QL NAA+PROBE: SIGNIFICANT CHANGE UP
LA NT DPL PPP QL: 41.2 SEC — SIGNIFICANT CHANGE UP
LACTATE BLDV-MCNC: 1.1 MMOL/L — SIGNIFICANT CHANGE UP (ref 0.7–2)
NORMALIZED SCT PPP-RTO: 1.01 RATIO — SIGNIFICANT CHANGE UP (ref 0–1.16)
NORMALIZED SCT PPP-RTO: SIGNIFICANT CHANGE UP
PCO2 BLDV: 40 MMHG — LOW (ref 42–55)
PH BLDV: 7.44 — HIGH (ref 7.32–7.43)
PO2 BLDV: 78 MMHG — HIGH (ref 25–45)
POTASSIUM BLDV-SCNC: 4 MMOL/L — SIGNIFICANT CHANGE UP (ref 3.5–5.1)
POTASSIUM SERPL-MCNC: 3.9 MMOL/L — SIGNIFICANT CHANGE UP (ref 3.5–5.3)
POTASSIUM SERPL-SCNC: 3.9 MMOL/L — SIGNIFICANT CHANGE UP (ref 3.5–5.3)
RHEUMATOID FACT SERPL-ACNC: <10 IU/ML — SIGNIFICANT CHANGE UP (ref 0–13)
SAO2 % BLDV: 96.4 % — HIGH (ref 67–88)
SODIUM SERPL-SCNC: 137 MMOL/L — SIGNIFICANT CHANGE UP (ref 135–145)
T PALLIDUM AB TITR SER: NEGATIVE — SIGNIFICANT CHANGE UP

## 2022-04-21 PROCEDURE — 99232 SBSQ HOSP IP/OBS MODERATE 35: CPT

## 2022-04-21 PROCEDURE — 99233 SBSQ HOSP IP/OBS HIGH 50: CPT

## 2022-04-21 PROCEDURE — 95720 EEG PHY/QHP EA INCR W/VEEG: CPT

## 2022-04-21 RX ORDER — POLYETHYLENE GLYCOL 3350 17 G/17G
17 POWDER, FOR SOLUTION ORAL EVERY 12 HOURS
Refills: 0 | Status: DISCONTINUED | OUTPATIENT
Start: 2022-04-21 | End: 2022-04-26

## 2022-04-21 RX ORDER — LABETALOL HCL 100 MG
300 TABLET ORAL THREE TIMES A DAY
Refills: 0 | Status: DISCONTINUED | OUTPATIENT
Start: 2022-04-21 | End: 2022-04-26

## 2022-04-21 RX ORDER — LACOSAMIDE 50 MG/1
100 TABLET ORAL
Refills: 0 | Status: DISCONTINUED | OUTPATIENT
Start: 2022-04-21 | End: 2022-04-22

## 2022-04-21 RX ORDER — LEVETIRACETAM 250 MG/1
750 TABLET, FILM COATED ORAL
Refills: 0 | Status: DISCONTINUED | OUTPATIENT
Start: 2022-04-21 | End: 2022-04-22

## 2022-04-21 RX ADMIN — LEVETIRACETAM 400 MILLIGRAM(S): 250 TABLET, FILM COATED ORAL at 18:02

## 2022-04-21 RX ADMIN — SENNA PLUS 2 TABLET(S): 8.6 TABLET ORAL at 22:05

## 2022-04-21 RX ADMIN — Medication 3 MILLILITER(S): at 17:15

## 2022-04-21 RX ADMIN — CHLORHEXIDINE GLUCONATE 1 APPLICATION(S): 213 SOLUTION TOPICAL at 22:06

## 2022-04-21 RX ADMIN — LEVETIRACETAM 400 MILLIGRAM(S): 250 TABLET, FILM COATED ORAL at 05:03

## 2022-04-21 RX ADMIN — LACOSAMIDE 120 MILLIGRAM(S): 50 TABLET ORAL at 17:03

## 2022-04-21 RX ADMIN — Medication 50 MILLIGRAM(S): at 05:02

## 2022-04-21 RX ADMIN — PIPERACILLIN AND TAZOBACTAM 25 GRAM(S): 4; .5 INJECTION, POWDER, LYOPHILIZED, FOR SOLUTION INTRAVENOUS at 13:15

## 2022-04-21 RX ADMIN — Medication 300 MILLIGRAM(S): at 05:02

## 2022-04-21 RX ADMIN — Medication 300 MILLIGRAM(S): at 22:05

## 2022-04-21 RX ADMIN — PIPERACILLIN AND TAZOBACTAM 25 GRAM(S): 4; .5 INJECTION, POWDER, LYOPHILIZED, FOR SOLUTION INTRAVENOUS at 22:30

## 2022-04-21 RX ADMIN — PIPERACILLIN AND TAZOBACTAM 25 GRAM(S): 4; .5 INJECTION, POWDER, LYOPHILIZED, FOR SOLUTION INTRAVENOUS at 06:05

## 2022-04-21 RX ADMIN — Medication 3 MILLILITER(S): at 11:13

## 2022-04-21 RX ADMIN — Medication 50 MILLIGRAM(S): at 13:15

## 2022-04-21 RX ADMIN — Medication 50 MILLIGRAM(S): at 22:05

## 2022-04-21 RX ADMIN — AMLODIPINE BESYLATE 5 MILLIGRAM(S): 2.5 TABLET ORAL at 05:02

## 2022-04-21 RX ADMIN — Medication 5 MILLIGRAM(S): at 13:15

## 2022-04-21 RX ADMIN — LEVETIRACETAM 400 MILLIGRAM(S): 250 TABLET, FILM COATED ORAL at 22:05

## 2022-04-21 RX ADMIN — LACOSAMIDE 120 MILLIGRAM(S): 50 TABLET ORAL at 06:05

## 2022-04-21 RX ADMIN — Medication 10 MILLIGRAM(S): at 01:40

## 2022-04-21 RX ADMIN — POLYETHYLENE GLYCOL 3350 17 GRAM(S): 17 POWDER, FOR SOLUTION ORAL at 17:15

## 2022-04-21 RX ADMIN — LACOSAMIDE 120 MILLIGRAM(S): 50 TABLET ORAL at 22:04

## 2022-04-21 RX ADMIN — Medication 300 MILLIGRAM(S): at 14:58

## 2022-04-21 NOTE — PROGRESS NOTE ADULT - ATTENDING COMMENTS
62 yo M w HTN presents w R parietal ICH, stable on subsequent scans. CTA without an underlying vascular lesion. TTE with severe hypertrophy.  MRI with significant microvascular disease and areas of prior hypertensive microhemorrhages.  Received Lasix for hypoxia last night, now much improved, on 2L NC    Awake, oriented x3, briskly follows commands, pupils 4mm b/l R briskly reactive, L sluggish, EOMI, face symmetric, with a L parietal drift, no LE drift    Etiology of ICH is likely 2/2 HTN.     SBP goal <160    Patient is not critically ill but is medically complex 2/2 ICH management and workup.
No reported seizures.     Continue antiepileptics.   Accepted to Stroke Service.
60 yo M w HTN presents w R parietal ICH, stable on subsequent scans. CTA without an underlying vascular lesion. TTE with severe hypertrophy.  MRI with significant microvascular disease and areas of prior hypertensive microhemorrhages.    Overnight with new onset focal status, s/p Ativan and Vimpat load     Sleepy, oriented x3, follows commands, pupils 4mm b/l R briskly reactive, L sluggish, EOMI, face symmetric, lifts all 4 extremities AG    Etiology of ICH is likely 2/2 HTN.     SBP goal <160  c/w Vimpat 100 mg BID and Keppra 750 mg BID, EKG for NE interval   start amlodipine 5 mg daily to transition off labetalol   f/u with neurosurg re timing of potential angiogram, non-urgent, re-start diet when not going for procedure    Patient is not critically ill but is medically complex 2/2 ICH and seizures.
Patient seen and examined by attending on 4/18/2022.    60 yo M w HTN presents w R parietal ICH, stable on subsequent scans. CTA without an underlying vascular lesion. TTE with severe hypertrophy.     Awake, oriented x3, briskly follows commands, pupils 4mm b/l R briskly reactive, L sluggish, EOMI, face symmetric, with a L parietal drift, no LE drift    Etiology of ICH is not clear at this time, with ddx of HTN, possible early onset CAA, possible underlying lesion.     MRI brain w/wo  can consider angiogram in a delayed fashion based on MRI results   SBP goal <160    Patient is critically ill due to ICH and at high risk for neurological deterioration or death due to: ICH at risk of expansion, at risk for seizures.
Wean off nicardipine gtt with labetalol.   VTE chemoppx tomorrow.  Speech and swallow eval; advance diet as tolerated.   Stroke c/s.  MRI to eval etiology.    Updated wife at bedside.
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
## 2022-04-21 NOTE — EEG REPORT - NS EEG TEXT BOX
[facility]   COMPREHENSIVE EPILEPSY CENTER   REPORT OF CONTINUOUS VIDEO EEG     [facility]: [address], [place] [address], Ph#: [phone]  [facility]: [id number] [place] [address], Ph#: [phone]  [facility]: [address] [place] [address], Ph#: [phone]    Patient Name: JEANCLAUDE LECONTE  Age and : 61y ([birth year])  MRN #: [id number]  Location: [place] [address] [address]   Referring Physician: Elizabeth Jennings    Start Time/Date: 08:00 on 22  End Time/Date: 08:00 on 22  Duration: 21 hours 07 mins   (interrupted from 16:12 to 19:15 for procedure)  _____________________________________________________________  STUDY INFORMATION    EEG Recording Technique:  The patient underwent continuous Video-EEG monitoring, using Telemetry System hardware on the XLTek Digital System. EEG and video data were stored on a computer hard drive with important events saved in digital archive files. The material was reviewed by a physician (electroencephalographer / epileptologist) on a daily basis. Minor and seizure detection algorithms were utilized and reviewed. An EEG Technician attended to the patient, and was available throughout daytime work hours.  The epilepsy center neurologist was available in person or on call 24-hours per day.    EEG Placement and Labeling of Electrodes:  The EEG was performed utilizing 20 channel referential EEG connections (coronal over temporal over parasagittal montage) using all standard 10-20 electrode placements with EKG, with additional electrodes placed in the inferior temporal region using the modified 10-10 montage electrode placements for elective admissions, or if deemed necessary. Recording was at a sampling rate of 256 samples per second per channel. Time synchronized digital video recording was done simultaneously with EEG recording. A low light infrared camera was used for low light recording.     _____________________________________________________________  HISTORY    Patient is a 61y old  Male who presents with a chief complaint of ICH (2022 07:09)    PERTINENT MEDICATION:  lacosamide IVPB 100 milliGRAM(s) IV Intermittent every 12 hours  levETIRAcetam  IVPB 750 milliGRAM(s) IV Intermittent every 12 hours  _____________________________________________________________  STUDY INTERPRETATION    Findings: The background was continuous and reactive. During wakefulness, the posterior dominant rhythm consisted of asymmetric well-modulated 8 Hz activity, best seen over the left, with amplitude to 30 uV, that attenuated to eye opening.      Background Slowing:  Continuous diffuse irregular theta/delta activity.    Focal Slowing:   Continuous polymorphic theta/delta slowing over the right hemisphere, parietocentral max.    Sleep Background:  Drowsiness was characterized by fragmentation, attenuation, and slowing of the background activity.    Sleep was characterized by the presence of vertex waves, asymmetric sleep spindles and K-complexes, better seen over the left.    Other Non-Epileptiform Findings:  None were present.    Interictal Epileptiform Activity:   None were present.    Seizures: There are frequent (6-8 per hour) focal seizures from right parasaggital region up to noon. Marked improvement noted after 1mg lorazepam and 200 lacosamide administration with shorter seizures, less frequent 2-3 per hour. After 19:00, there were no discrete seizures noted, however with residual sporadic rhythmic activity which appeared inter-ictal.  EEG: Right parasagittal (C4/P4/Cz/Pz ) rhythmic theta  to rhythmical 3-4Hz delta with overriding faster activity, before abrupt offset, followed by 1-2 second of diffuse electrical attenuation. Approximate duration 30 seconds.  Clinical: Beginning of seizures with no apparent change on video. However, 4-7 seconds prior to electrographic offset, there is LUE clonic activity seen with patient appearing uncomfortable and adjusting his LUE repeatedly. In one instance, pt had extension of LUE followed by jerks after seizure onset. Majority of the seizures in this study appear subclinical.    Activation Procedures:   Hyperventilation was not performed.    Photic stimulation was performed and did not elicit any abnormality. Photic driving was seen.    Artifacts:  Intermittent myogenic and movement artifacts were noted.    ECG:  The heart rate on single channel ECG was predominantly between 60-80 BPM.    _____________________________________________________________  EEG SUMMARY/CLASSIFICATION    Abnormal EEG in the awake, drowsy and asleep states.    - Frequent focal seizures from right parasagittal region, improving after noon with escalation of AEDs, with resolution of discrete seizures after 19:00, as described above.    - Continuous polymorphic theta/delta slowing over the right hemisphere, parietocentral max.  - Background slowing, generalized.    _____________________________________________________________  EEG IMPRESSION/CLINICAL CORRELATE    Abnormal EEG study.    - Frequent focal right parasagittal seizures, some associated with LUE clonic jerks while majority were subclinical, with no further discrete seizures after 19:00 on 22. There remains risk of recurrent seizures from the same region.  - Structural / functional abnormality in the right hemisphere, parietocentral max.   - Mild nonspecific diffuse or multifocal cerebral dysfunction.     *** PRELIMINARY REPORT - PENDING EPILEPSY ATTENDING REVIEW ***  _____________________________________________________________    Wale Sullivan MD, ADALGISA  Fellow, [facility] [place]         [facility]   COMPREHENSIVE EPILEPSY CENTER   REPORT OF CONTINUOUS VIDEO EEG     [facility]: [address], [place] [address], Ph#: [phone]  [facility]: [id number] [place] [address], Ph#: [phone]  [facility]: [address] [place] [address], Ph#: [phone]    Patient Name: JEANCLAUDE LECONTE  Age and : 61y ([birth year])  MRN #: [id number]  Location: [place] [address] [address]   Referring Physician: Elizabeth Jennings    Start Time/Date: 08:00 on 22  End Time/Date: 08:00 on 22  Duration: 21 hours 07 mins   (interrupted from 16:12 to 19:15 for procedure)  _____________________________________________________________  STUDY INFORMATION    EEG Recording Technique:  The patient underwent continuous Video-EEG monitoring, using Telemetry System hardware on the XLTek Digital System. EEG and video data were stored on a computer hard drive with important events saved in digital archive files. The material was reviewed by a physician (electroencephalographer / epileptologist) on a daily basis. Minor and seizure detection algorithms were utilized and reviewed. An EEG Technician attended to the patient, and was available throughout daytime work hours.  The epilepsy center neurologist was available in person or on call 24-hours per day.    EEG Placement and Labeling of Electrodes:  The EEG was performed utilizing 20 channel referential EEG connections (coronal over temporal over parasagittal montage) using all standard 10-20 electrode placements with EKG, with additional electrodes placed in the inferior temporal region using the modified 10-10 montage electrode placements for elective admissions, or if deemed necessary. Recording was at a sampling rate of 256 samples per second per channel. Time synchronized digital video recording was done simultaneously with EEG recording. A low light infrared camera was used for low light recording.     _____________________________________________________________  HISTORY    Patient is a 61y old  Male who presents with a chief complaint of ICH (2022 07:09)    PERTINENT MEDICATION:  lacosamide IVPB 100 milliGRAM(s) IV Intermittent every 12 hours  levETIRAcetam  IVPB 750 milliGRAM(s) IV Intermittent every 12 hours  _____________________________________________________________  STUDY INTERPRETATION    Findings: The background was continuous and reactive. During wakefulness, the posterior dominant rhythm consisted of asymmetric well-modulated 8 Hz activity, best seen over the left, with amplitude to 30 uV, that attenuated to eye opening.      Background Slowing:  Continuous diffuse irregular theta/delta activity.    Focal Slowing:   Continuous polymorphic theta/delta slowing over the right hemisphere, parietocentral max.    Sleep Background:  Drowsiness was characterized by fragmentation, attenuation, and slowing of the background activity.    Sleep was characterized by the presence of vertex waves, asymmetric sleep spindles and K-complexes, better seen over the left.    Other Non-Epileptiform Findings:  None were present.    Interictal Epileptiform Activity:   Intermittent fluctuating right hemispheric LPDs up to 1.5 Hz    Seizures: There are frequent  focal seizures from right parasaggital region up to noon. Marked improvement noted after 1mg lorazepam and 200 lacosamide administration. No seizures after 12:15 on 22.  EEG: Right parasagittal (C4/P4/Cz/Pz ) rhythmic theta  to rhythmical 3-4Hz delta with overriding faster activity, before abrupt offset, followed by 1-2 second of diffuse electrical attenuation. Approximate duration 30 seconds.  Clinical: Beginning of seizures with no apparent change on video. However, 4-7 seconds prior to electrographic offset, there is LUE clonic activity seen with patient appearing uncomfortable and adjusting his LUE repeatedly. In one instance, pt had extension of LUE followed by jerks after seizure onset. Majority of the seizures in this study appear subclinical.    Activation Procedures:   Hyperventilation was not performed.    Photic stimulation was performed and did not elicit any abnormality. Photic driving was seen.    Artifacts:  Intermittent myogenic and movement artifacts were noted.    ECG:  The heart rate on single channel ECG was predominantly between 60-80 BPM.    _____________________________________________________________  EEG SUMMARY/CLASSIFICATION    Abnormal EEG in the awake, drowsy and asleep states.    - Frequent focal seizures from right parasagittal region, improving after noon with escalation of AEDs.  - Intermittent fluctuating right hemispheric LPDs up to 1.5 Hz  - Continuous polymorphic theta/delta slowing over the right hemisphere, parietocentral max.  - Background slowing, generalized.    _____________________________________________________________  EEG IMPRESSION/CLINICAL CORRELATE    Abnormal EEG study.    - Frequent focal right parasagittal seizures, some associated with LUE clonic jerks while majority were subclinical, with no further discrete seizures after 12:15 on 22. There remains risk of recurrent seizures from the same region.  - Structural / functional abnormality in the right hemisphere, parietocentral max.   - Mild nonspecific diffuse or multifocal cerebral dysfunction.     Discussed with NSCU team.    Wale Sullivan MD, ADALGISA  Fellow, [facility] [place]

## 2022-04-22 LAB
ACE SERPL-CCNC: 37 U/L — SIGNIFICANT CHANGE UP (ref 14–82)
ANA TITR SER: NEGATIVE — SIGNIFICANT CHANGE UP
ANION GAP SERPL CALC-SCNC: 12 MMOL/L — SIGNIFICANT CHANGE UP (ref 5–17)
ANTI-RIBONUCLEAR PROTEIN: <0.2 AI — SIGNIFICANT CHANGE UP
BUN SERPL-MCNC: 23 MG/DL — SIGNIFICANT CHANGE UP (ref 7–23)
CALCIUM SERPL-MCNC: 8.6 MG/DL — SIGNIFICANT CHANGE UP (ref 8.4–10.5)
CHLORIDE SERPL-SCNC: 102 MMOL/L — SIGNIFICANT CHANGE UP (ref 96–108)
CO2 SERPL-SCNC: 22 MMOL/L — SIGNIFICANT CHANGE UP (ref 22–31)
CREAT SERPL-MCNC: 1.29 MG/DL — SIGNIFICANT CHANGE UP (ref 0.5–1.3)
DSDNA AB FLD-ACNC: <0.2 AI — SIGNIFICANT CHANGE UP
DSDNA AB SER-ACNC: <12 IU/ML — SIGNIFICANT CHANGE UP
EGFR: 62 ML/MIN/1.73M2 — SIGNIFICANT CHANGE UP
ENA SCL70 AB SER-ACNC: <0.2 AI — SIGNIFICANT CHANGE UP
ENA SS-A AB FLD IA-ACNC: <0.2 AI — SIGNIFICANT CHANGE UP
GLUCOSE SERPL-MCNC: 106 MG/DL — HIGH (ref 70–99)
HCT VFR BLD CALC: 34.6 % — LOW (ref 39–50)
HGB BLD-MCNC: 11.2 G/DL — LOW (ref 13–17)
KAPPA LC SER QL IFE: 3.03 MG/DL — HIGH (ref 0.33–1.94)
KAPPA/LAMBDA FREE LIGHT CHAIN RATIO, SERUM: 1.42 RATIO — SIGNIFICANT CHANGE UP (ref 0.26–1.65)
LAMBDA LC SER QL IFE: 2.14 MG/DL — SIGNIFICANT CHANGE UP (ref 0.57–2.63)
MCHC RBC-ENTMCNC: 27.6 PG — SIGNIFICANT CHANGE UP (ref 27–34)
MCHC RBC-ENTMCNC: 32.4 GM/DL — SIGNIFICANT CHANGE UP (ref 32–36)
MCV RBC AUTO: 85.2 FL — SIGNIFICANT CHANGE UP (ref 80–100)
MITOCHONDRIA AB SER-ACNC: SIGNIFICANT CHANGE UP
NRBC # BLD: 0 /100 WBCS — SIGNIFICANT CHANGE UP (ref 0–0)
PLATELET # BLD AUTO: 169 K/UL — SIGNIFICANT CHANGE UP (ref 150–400)
POTASSIUM SERPL-MCNC: 3.8 MMOL/L — SIGNIFICANT CHANGE UP (ref 3.5–5.3)
POTASSIUM SERPL-SCNC: 3.8 MMOL/L — SIGNIFICANT CHANGE UP (ref 3.5–5.3)
RBC # BLD: 4.06 M/UL — LOW (ref 4.2–5.8)
RBC # FLD: 14.8 % — HIGH (ref 10.3–14.5)
SODIUM SERPL-SCNC: 136 MMOL/L — SIGNIFICANT CHANGE UP (ref 135–145)
WBC # BLD: 8.42 K/UL — SIGNIFICANT CHANGE UP (ref 3.8–10.5)
WBC # FLD AUTO: 8.42 K/UL — SIGNIFICANT CHANGE UP (ref 3.8–10.5)

## 2022-04-22 PROCEDURE — 95720 EEG PHY/QHP EA INCR W/VEEG: CPT

## 2022-04-22 PROCEDURE — 99233 SBSQ HOSP IP/OBS HIGH 50: CPT

## 2022-04-22 RX ORDER — LEVETIRACETAM 250 MG/1
1000 TABLET, FILM COATED ORAL
Refills: 0 | Status: DISCONTINUED | OUTPATIENT
Start: 2022-04-22 | End: 2022-04-26

## 2022-04-22 RX ORDER — ENOXAPARIN SODIUM 100 MG/ML
40 INJECTION SUBCUTANEOUS EVERY 24 HOURS
Refills: 0 | Status: DISCONTINUED | OUTPATIENT
Start: 2022-04-22 | End: 2022-04-26

## 2022-04-22 RX ORDER — LACOSAMIDE 50 MG/1
150 TABLET ORAL
Refills: 0 | Status: DISCONTINUED | OUTPATIENT
Start: 2022-04-22 | End: 2022-04-23

## 2022-04-22 RX ADMIN — Medication 3 MILLILITER(S): at 05:27

## 2022-04-22 RX ADMIN — Medication 50 MILLIGRAM(S): at 05:27

## 2022-04-22 RX ADMIN — AMLODIPINE BESYLATE 5 MILLIGRAM(S): 2.5 TABLET ORAL at 05:27

## 2022-04-22 RX ADMIN — Medication 3 MILLILITER(S): at 00:28

## 2022-04-22 RX ADMIN — LEVETIRACETAM 1000 MILLIGRAM(S): 250 TABLET, FILM COATED ORAL at 15:39

## 2022-04-22 RX ADMIN — Medication 300 MILLIGRAM(S): at 21:45

## 2022-04-22 RX ADMIN — POLYETHYLENE GLYCOL 3350 17 GRAM(S): 17 POWDER, FOR SOLUTION ORAL at 05:27

## 2022-04-22 RX ADMIN — Medication 50 MILLIGRAM(S): at 21:45

## 2022-04-22 RX ADMIN — Medication 3 MILLILITER(S): at 14:11

## 2022-04-22 RX ADMIN — LACOSAMIDE 120 MILLIGRAM(S): 50 TABLET ORAL at 14:04

## 2022-04-22 RX ADMIN — LACOSAMIDE 150 MILLIGRAM(S): 50 TABLET ORAL at 18:25

## 2022-04-22 RX ADMIN — ENOXAPARIN SODIUM 40 MILLIGRAM(S): 100 INJECTION SUBCUTANEOUS at 14:04

## 2022-04-22 RX ADMIN — LEVETIRACETAM 400 MILLIGRAM(S): 250 TABLET, FILM COATED ORAL at 05:27

## 2022-04-22 RX ADMIN — Medication 50 MILLIGRAM(S): at 14:12

## 2022-04-22 RX ADMIN — Medication 300 MILLIGRAM(S): at 14:12

## 2022-04-22 RX ADMIN — PIPERACILLIN AND TAZOBACTAM 25 GRAM(S): 4; .5 INJECTION, POWDER, LYOPHILIZED, FOR SOLUTION INTRAVENOUS at 05:20

## 2022-04-22 RX ADMIN — Medication 300 MILLIGRAM(S): at 07:42

## 2022-04-22 RX ADMIN — LACOSAMIDE 120 MILLIGRAM(S): 50 TABLET ORAL at 05:26

## 2022-04-22 NOTE — EEG REPORT - NS EEG TEXT BOX
[facility]   COMPREHENSIVE EPILEPSY CENTER   REPORT OF CONTINUOUS VIDEO EEG     [facility]: [address], [place] [address], Ph#: [phone]  [facility]: [id number], [place] [address], Ph#: [phone]  [facility]: [address] [place] [address], Ph#: [phone]    Patient Name: JEANCLAUDE LECONTE  Age and : 61y ([birth year])  MRN #: [id number]  Location: [place] [address] [address]   Referring Physician: Elizabeth Jennings    Start Time/Date: 08:00 on 22  End Time/Date: 08:00 on 22  Duration: 24  _____________________________________________________________  STUDY INFORMATION    EEG Recording Technique:  The patient underwent continuous Video-EEG monitoring, using Telemetry System hardware on the XLTek Digital System. EEG and video data were stored on a computer hard drive with important events saved in digital archive files. The material was reviewed by a physician (electroencephalographer / epileptologist) on a daily basis. Minor and seizure detection algorithms were utilized and reviewed. An EEG Technician attended to the patient, and was available throughout daytime work hours.  The epilepsy center neurologist was available in person or on call 24-hours per day.    EEG Placement and Labeling of Electrodes:  The EEG was performed utilizing 20 channel referential EEG connections (coronal over temporal over parasagittal montage) using all standard 10-20 electrode placements with EKG, with additional electrodes placed in the inferior temporal region using the modified 10-10 montage electrode placements for elective admissions, or if deemed necessary. Recording was at a sampling rate of 256 samples per second per channel. Time synchronized digital video recording was done simultaneously with EEG recording. A low light infrared camera was used for low light recording.     _____________________________________________________________  HISTORY    Patient is a 61y old  Male who presents with a chief complaint of ICH (2022 07:09)    PERTINENT MEDICATION:  MEDICATIONS  (STANDING):  albuterol/ipratropium for Nebulization 3 milliLiter(s) Nebulizer every 6 hours  amLODIPine   Tablet 5 milliGRAM(s) Oral daily  chlorhexidine 4% Liquid 1 Application(s) Topical <User Schedule>  hydrALAZINE 50 milliGRAM(s) Oral every 8 hours  labetalol 300 milliGRAM(s) Oral three times a day  lacosamide IVPB 100 milliGRAM(s) IV Intermittent <User Schedule>  levETIRAcetam  IVPB 750 milliGRAM(s) IV Intermittent <User Schedule>  polyethylene glycol 3350 17 Gram(s) Oral every 12 hours  senna 2 Tablet(s) Oral at bedtime    STUDY INTERPRETATION    Findings: The background was continuous and reactive. During wakefulness, the posterior dominant rhythm consisted of asymmetric well-modulated 7 Hz activity seen over the left. No clear posterior dominant rhythm seen over the right.     Background Slowing:  Continuous diffuse theta/delta activity.    Focal Slowing:   Continuous polymorphic theta/delta slowing over the right hemisphere, right parasagittal max.    Sleep Background:  Drowsiness was characterized by fragmentation, attenuation, and slowing of the background activity.    Sleep was characterized by the presence of vertex waves, asymmetric sleep spindles and K-complexes, better seen over the left.    Other Non-Epileptiform Findings:  None were present.    Interictal Epileptiform Activity:   Near continuous fluctuating right parasagittal lateralized periodic discharges (LPDs) up to 2 Hz at times with a right frontal predominance    Seizures:   No seizures seen    Activation Procedures:   Hyperventilation was not performed.    Photic stimulation was performed and did not elicit any abnormality. Photic driving was seen.    Artifacts:  Intermittent myogenic and movement artifacts were noted.    ECG:  The heart rate on single channel ECG was predominantly between 80-90 BPM.    _____________________________________________________________  EEG SUMMARY/CLASSIFICATION    Abnormal EEG in the awake, drowsy and asleep states.  - Near continuous fluctuating right parasagittal LPDs up to 2 Hz   - Continuous slowing over the right hemisphere, right parasagittal max.  - Mild diffuse generalized slowing    _____________________________________________________________  EEG IMPRESSION/CLINICAL CORRELATE    Abnormal EEG study.  - Cortical excitability in the right parasagittal region with the presence of near continuous LPDs, increased risk of seizures from this region.  - Structural / functional abnormality in the right hemisphere, right parasagittal max.   - Mild nonspecific diffuse or multifocal cerebral dysfunction.   - No seizures seen in the past 24 hours.    Shira Rhodes DO  Attending Physician  [facility] [place]         [facility]   COMPREHENSIVE EPILEPSY CENTER   REPORT OF CONTINUOUS VIDEO EEG     [facility]: [address], [place] [address], Ph#: [phone]  [facility]: [id number], [place] [address], Ph#: [phone]  [facility]: [address] [place] [address], Ph#: [phone]    Patient Name: JEANCLAUDE LECONTE  Age and : 61y ([birth year])  MRN #: [id number]  Location: [place] [address] [address]   Referring Physician: Elizabeth Jennings    Start Time/Date: 08:00 on 22  End Time/Date: 08:00 on 22  Duration: 24 h  _____________________________________________________________  STUDY INFORMATION    EEG Recording Technique:  The patient underwent continuous Video-EEG monitoring, using Telemetry System hardware on the XLTek Digital System. EEG and video data were stored on a computer hard drive with important events saved in digital archive files. The material was reviewed by a physician (electroencephalographer / epileptologist) on a daily basis. Minor and seizure detection algorithms were utilized and reviewed. An EEG Technician attended to the patient, and was available throughout daytime work hours.  The epilepsy center neurologist was available in person or on call 24-hours per day.    EEG Placement and Labeling of Electrodes:  The EEG was performed utilizing 20 channel referential EEG connections (coronal over temporal over parasagittal montage) using all standard 10-20 electrode placements with EKG, with additional electrodes placed in the inferior temporal region using the modified 10-10 montage electrode placements for elective admissions, or if deemed necessary. Recording was at a sampling rate of 256 samples per second per channel. Time synchronized digital video recording was done simultaneously with EEG recording. A low light infrared camera was used for low light recording.     _____________________________________________________________  HISTORY    Patient is a 61y old  Male who presents with a chief complaint of ICH (2022 07:09)    PERTINENT MEDICATION:  MEDICATIONS  (STANDING):  albuterol/ipratropium for Nebulization 3 milliLiter(s) Nebulizer every 6 hours  amLODIPine   Tablet 5 milliGRAM(s) Oral daily  chlorhexidine 4% Liquid 1 Application(s) Topical <User Schedule>  hydrALAZINE 50 milliGRAM(s) Oral every 8 hours  labetalol 300 milliGRAM(s) Oral three times a day  lacosamide IVPB 100 milliGRAM(s) IV Intermittent <User Schedule>  levETIRAcetam  IVPB 750 milliGRAM(s) IV Intermittent <User Schedule>  polyethylene glycol 3350 17 Gram(s) Oral every 12 hours  senna 2 Tablet(s) Oral at bedtime    STUDY INTERPRETATION    Findings: The background was continuous and reactive. During wakefulness, the posterior dominant rhythm consisted of asymmetric well-modulated 7 Hz activity seen over the left. No clear posterior dominant rhythm seen over the right.     Background Slowing:  Continuous diffuse theta/delta activity.    Focal Slowing:   Continuous polymorphic theta/delta slowing over the right hemisphere, right parasagittal max.    Sleep Background:  Drowsiness was characterized by fragmentation, attenuation, and slowing of the background activity.    Sleep was characterized by the presence of vertex waves, asymmetric sleep spindles and K-complexes, better seen over the left.    Other Non-Epileptiform Findings:  None were present.    Interictal Epileptiform Activity:   Near continuous fluctuating right parasagittal lateralized periodic discharges (LPDs) up to 2 Hz at times with a right frontal predominance    Seizures:   No seizures seen    Activation Procedures:   Hyperventilation was not performed.    Photic stimulation was performed and did not elicit any abnormality. Photic driving was seen.    Artifacts:  Intermittent myogenic and movement artifacts were noted.    ECG:  The heart rate on single channel ECG was predominantly between 80-90 BPM.    _____________________________________________________________  EEG SUMMARY/CLASSIFICATION    Abnormal EEG in the awake, drowsy and asleep states.  - Near continuous fluctuating right parasagittal LPDs up to 2 Hz   - Continuous slowing over the right hemisphere, right parasagittal max.  - Mild diffuse generalized slowing    _____________________________________________________________  EEG IMPRESSION/CLINICAL CORRELATE    Abnormal EEG study.  - Cortical excitability in the right parasagittal region with the presence of near continuous LPDs, increased risk of seizures from this region.  - Structural / functional abnormality in the right hemisphere, right parasagittal max.   - Mild nonspecific diffuse or multifocal cerebral dysfunction.   - No seizures seen in the past 24 hours.    Shira Rhodes DO  Attending Physician  [facility] [place]

## 2022-04-22 NOTE — PROVIDER CONTACT NOTE (OTHER) - SITUATION
SBP>160 again despite given prn hydralazine
Pt had jerky movements on his left arm for 2minutes. But pt is aware about it.

## 2022-04-22 NOTE — PROVIDER CONTACT NOTE (OTHER) - REASON
SBP. 160 again despite given prn hydralazine
Pt had jerky movements on his left arm for 2minutes. But pt is aware about it.
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
## 2022-04-22 NOTE — PROVIDER CONTACT NOTE (OTHER) - ASSESSMENT
Pt had jerky movements on his left arm for 2minutes. But pt is aware about it.
Pt denies any pain or discomfort despite uncontrollable left arm movement increase during hypertensive event. no other neurological changes
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
## 2022-04-23 LAB
ANION GAP SERPL CALC-SCNC: 12 MMOL/L — SIGNIFICANT CHANGE UP (ref 5–17)
BUN SERPL-MCNC: 18 MG/DL — SIGNIFICANT CHANGE UP (ref 7–23)
CALCIUM SERPL-MCNC: 8.7 MG/DL — SIGNIFICANT CHANGE UP (ref 8.4–10.5)
CHLORIDE SERPL-SCNC: 103 MMOL/L — SIGNIFICANT CHANGE UP (ref 96–108)
CMV DNA CSF QL NAA+PROBE: SIGNIFICANT CHANGE UP
CO2 SERPL-SCNC: 22 MMOL/L — SIGNIFICANT CHANGE UP (ref 22–31)
CREAT SERPL-MCNC: 1.12 MG/DL — SIGNIFICANT CHANGE UP (ref 0.5–1.3)
EGFR: 74 ML/MIN/1.73M2 — SIGNIFICANT CHANGE UP
GLUCOSE SERPL-MCNC: 108 MG/DL — HIGH (ref 70–99)
HCT VFR BLD CALC: 33.9 % — LOW (ref 39–50)
HGB BLD-MCNC: 10.9 G/DL — LOW (ref 13–17)
MCHC RBC-ENTMCNC: 27.5 PG — SIGNIFICANT CHANGE UP (ref 27–34)
MCHC RBC-ENTMCNC: 32.2 GM/DL — SIGNIFICANT CHANGE UP (ref 32–36)
MCV RBC AUTO: 85.4 FL — SIGNIFICANT CHANGE UP (ref 80–100)
MPO AB + PR3 PNL SER: SIGNIFICANT CHANGE UP
NRBC # BLD: 0 /100 WBCS — SIGNIFICANT CHANGE UP (ref 0–0)
PLATELET # BLD AUTO: 169 K/UL — SIGNIFICANT CHANGE UP (ref 150–400)
POTASSIUM SERPL-MCNC: 3.9 MMOL/L — SIGNIFICANT CHANGE UP (ref 3.5–5.3)
POTASSIUM SERPL-SCNC: 3.9 MMOL/L — SIGNIFICANT CHANGE UP (ref 3.5–5.3)
PROT SERPL-MCNC: 6.7 G/DL — SIGNIFICANT CHANGE UP (ref 6–8.3)
PROT SERPL-MCNC: 6.7 G/DL — SIGNIFICANT CHANGE UP (ref 6–8.3)
RBC # BLD: 3.97 M/UL — LOW (ref 4.2–5.8)
RBC # FLD: 14.6 % — HIGH (ref 10.3–14.5)
SODIUM SERPL-SCNC: 137 MMOL/L — SIGNIFICANT CHANGE UP (ref 135–145)
WBC # BLD: 8.4 K/UL — SIGNIFICANT CHANGE UP (ref 3.8–10.5)
WBC # FLD AUTO: 8.4 K/UL — SIGNIFICANT CHANGE UP (ref 3.8–10.5)

## 2022-04-23 PROCEDURE — 95718 EEG PHYS/QHP 2-12 HR W/VEEG: CPT

## 2022-04-23 PROCEDURE — 99233 SBSQ HOSP IP/OBS HIGH 50: CPT

## 2022-04-23 PROCEDURE — 93010 ELECTROCARDIOGRAM REPORT: CPT

## 2022-04-23 RX ORDER — ASPIRIN/CALCIUM CARB/MAGNESIUM 324 MG
81 TABLET ORAL DAILY
Refills: 0 | Status: DISCONTINUED | OUTPATIENT
Start: 2022-04-23 | End: 2022-04-26

## 2022-04-23 RX ORDER — LACOSAMIDE 50 MG/1
200 TABLET ORAL
Refills: 0 | Status: DISCONTINUED | OUTPATIENT
Start: 2022-04-23 | End: 2022-04-26

## 2022-04-23 RX ADMIN — LEVETIRACETAM 1000 MILLIGRAM(S): 250 TABLET, FILM COATED ORAL at 17:57

## 2022-04-23 RX ADMIN — LACOSAMIDE 200 MILLIGRAM(S): 50 TABLET ORAL at 17:57

## 2022-04-23 RX ADMIN — Medication 300 MILLIGRAM(S): at 16:15

## 2022-04-23 RX ADMIN — Medication 300 MILLIGRAM(S): at 22:10

## 2022-04-23 RX ADMIN — Medication 81 MILLIGRAM(S): at 16:15

## 2022-04-23 RX ADMIN — ENOXAPARIN SODIUM 40 MILLIGRAM(S): 100 INJECTION SUBCUTANEOUS at 14:21

## 2022-04-23 RX ADMIN — LACOSAMIDE 150 MILLIGRAM(S): 50 TABLET ORAL at 06:08

## 2022-04-23 RX ADMIN — Medication 50 MILLIGRAM(S): at 14:21

## 2022-04-23 RX ADMIN — Medication 50 MILLIGRAM(S): at 06:08

## 2022-04-23 RX ADMIN — AMLODIPINE BESYLATE 5 MILLIGRAM(S): 2.5 TABLET ORAL at 06:08

## 2022-04-23 RX ADMIN — LEVETIRACETAM 1000 MILLIGRAM(S): 250 TABLET, FILM COATED ORAL at 06:09

## 2022-04-23 RX ADMIN — Medication 300 MILLIGRAM(S): at 06:08

## 2022-04-23 RX ADMIN — Medication 50 MILLIGRAM(S): at 22:10
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
## 2022-04-23 NOTE — EEG REPORT - NS EEG TEXT BOX
[facility]   COMPREHENSIVE EPILEPSY CENTER   REPORT OF CONTINUOUS VIDEO EEG     [facility]: [address], [place] [address], Ph#: [phone]  [facility]: [address], [place] [address], Ph#: [phone]  [facility]: [address] [place] [address], Ph#: [phone]    Patient Name: JEANCLAUDE LECONTE  Age and : 61y ([birth year])  MRN #: [id number]  Location: [place] [address] [address]   Referring Physician: Richard Libman    Start Time/Date: 08:00 on 22  End Time/Date: 08:00 on 22  Duration: 24 hours  _____________________________________________________________  STUDY INFORMATION    EEG Recording Technique:  The patient underwent continuous Video-EEG monitoring, using Telemetry System hardware on the XLTek Digital System. EEG and video data were stored on a computer hard drive with important events saved in digital archive files. The material was reviewed by a physician (electroencephalographer / epileptologist) on a daily basis. Minor and seizure detection algorithms were utilized and reviewed. An EEG Technician attended to the patient, and was available throughout daytime work hours.  The epilepsy center neurologist was available in person or on call 24-hours per day.    EEG Placement and Labeling of Electrodes:  The EEG was performed utilizing 20 channel referential EEG connections (coronal over temporal over parasagittal montage) using all standard 10-20 electrode placements with EKG, with additional electrodes placed in the inferior temporal region using the modified 10-10 montage electrode placements for elective admissions, or if deemed necessary. Recording was at a sampling rate of 256 samples per second per channel. Time synchronized digital video recording was done simultaneously with EEG recording. A low light infrared camera was used for low light recording.     _____________________________________________________________  HISTORY    Patient is a 61y old  Male who presents with a chief complaint of ICH (2022 07:09)    PERTINENT MEDICATION:  lacosamide 150 milliGRAM(s) Oral two times a day  levETIRAcetam 1000 milliGRAM(s) Oral two times a day    STUDY INTERPRETATION    Findings: The background was continuous and reactive. During wakefulness, the posterior dominant rhythm consisted of asymmetric well-modulated 7 Hz activity seen over the left. No clear posterior dominant rhythm seen over the right.     Background Slowing:  Continuous diffuse theta/delta activity.    Focal Slowing:   Continuous polymorphic theta/delta slowing over the right hemisphere, right parasagittal max.    Sleep Background:  Drowsiness was characterized by fragmentation, attenuation, and slowing of the background activity.    Sleep was characterized by the presence of vertex waves, asymmetric sleep spindles and K-complexes, better seen over the left.    Other Non-Epileptiform Findings:  None were present.    Interictal Epileptiform Activity:   Near continuous fluctuating right parasagittal lateralized periodic discharges (LPDs) up to 2 Hz at times with a right frontal predominance    Seizures:   No seizures seen    Activation Procedures:   Hyperventilation was not performed.    Photic stimulation was performed and did not elicit any abnormality. Photic driving was seen.    Artifacts:  Intermittent myogenic and movement artifacts were noted.    ECG:  The heart rate on single channel ECG was predominantly between 80-90 BPM.    _____________________________________________________________  EEG SUMMARY/CLASSIFICATION    Abnormal EEG in the awake, drowsy and asleep states.    - Near continuous fluctuating right parasagittal LPDs up to 2 Hz   - Continuous slowing over the right hemisphere, right parasagittal max.  - diffuse generalized slowing    _____________________________________________________________  EEG IMPRESSION/CLINICAL CORRELATE    Abnormal EEG study.    - Cortical excitability in the right parasagittal region with the presence of near continuous LPDs, increased risk of seizures from this region.  - Structural / functional abnormality in the right hemisphere, right parasagittal max.   - Mild to moderate nonspecific diffuse or multifocal cerebral dysfunction.   - No seizures seen in the past 48 hours.  ____________    Wale Sullivan MD, ADALGISA  Fellow | [facility]       [facility] EEG Reading Room Ph#: [phone]  Epilepsy Answering Service after 5PM and before 8:30AM: Ph#: [phone].           [facility]   COMPREHENSIVE EPILEPSY CENTER   REPORT OF CONTINUOUS VIDEO EEG     [facility]: [address], [place] [address], Ph#: [phone]  [facility]: [address], [place] [address], Ph#: [phone]  [facility]: [address] [place] [address], Ph#: [phone]    Patient Name: JEANCLAUDE LECONTE  Age and : 61y ([birth year])  MRN #: [id number]  Location: [place] [address] [address]   Referring Physician: Richard Libman    Start Time/Date: 08:00 on 22  End Time/Date: 12:23 on 22  Duration: 28 hours  _____________________________________________________________  STUDY INFORMATION    EEG Recording Technique:  The patient underwent continuous Video-EEG monitoring, using Telemetry System hardware on the XLTek Digital System. EEG and video data were stored on a computer hard drive with important events saved in digital archive files. The material was reviewed by a physician (electroencephalographer / epileptologist) on a daily basis. Minor and seizure detection algorithms were utilized and reviewed. An EEG Technician attended to the patient, and was available throughout daytime work hours.  The epilepsy center neurologist was available in person or on call 24-hours per day.    EEG Placement and Labeling of Electrodes:  The EEG was performed utilizing 20 channel referential EEG connections (coronal over temporal over parasagittal montage) using all standard 10-20 electrode placements with EKG, with additional electrodes placed in the inferior temporal region using the modified 10-10 montage electrode placements for elective admissions, or if deemed necessary. Recording was at a sampling rate of 256 samples per second per channel. Time synchronized digital video recording was done simultaneously with EEG recording. A low light infrared camera was used for low light recording.     _____________________________________________________________  HISTORY    Patient is a 61y old  Male who presents with a chief complaint of ICH (2022 07:09)    PERTINENT MEDICATION:  lacosamide 150 milliGRAM(s) Oral two times a day  levETIRAcetam 1000 milliGRAM(s) Oral two times a day    STUDY INTERPRETATION    Findings: The background was continuous and reactive. During wakefulness, the posterior dominant rhythm consisted of asymmetric well-modulated 7 Hz activity seen over the left. No clear posterior dominant rhythm seen over the right.     Background Slowing:  Continuous diffuse theta/delta activity.    Focal Slowing:   Continuous polymorphic theta/delta slowing over the right hemisphere, right parasagittal max.    Sleep Background:  Drowsiness was characterized by fragmentation, attenuation, and slowing of the background activity.    Sleep was characterized by the presence of vertex waves, asymmetric sleep spindles and K-complexes, better seen over the left.    Other Non-Epileptiform Findings:  None were present.    Interictal Epileptiform Activity:   Near continuous fluctuating right parasagittal lateralized periodic discharges (LPDs) up to 2 Hz at times with a right frontal predominance    Seizures:   No seizures seen    Activation Procedures:   Hyperventilation was not performed.    Photic stimulation was performed and did not elicit any abnormality. Photic driving was seen.    Artifacts:  Intermittent myogenic and movement artifacts were noted.    ECG:  The heart rate on single channel ECG was predominantly between 80-90 BPM.    _____________________________________________________________  EEG SUMMARY/CLASSIFICATION    Abnormal EEG in the awake, drowsy and asleep states.    - Near continuous fluctuating right parasagittal LPDs up to 2 Hz   - Continuous slowing over the right hemisphere, right parasagittal max.  - diffuse generalized slowing    _____________________________________________________________  EEG IMPRESSION/CLINICAL CORRELATE    Abnormal EEG study.    - Cortical excitability in the right parasagittal region with the presence of near continuous LPDs, increased risk of seizures from this region.  - Structural / functional abnormality in the right hemisphere, right parasagittal max.   - Mild to moderate nonspecific diffuse or multifocal cerebral dysfunction.   - No seizures seen in the past 48 hours.  ____________    Wale Sullivan MD, ADALGISA  Fellow | [facility]       [facility] EEG Reading Room Ph#: [phone]  Epilepsy Answering Service after 5PM and before 8:30AM: Ph#: [phone].           [facility]   COMPREHENSIVE EPILEPSY CENTER   REPORT OF CONTINUOUS VIDEO EEG     [facility]: [address], [place] [address], Ph#: [phone]  [facility]: [address], [place] [address], Ph#: [phone]  [facility]: [address] [place] [address], Ph#: [phone]    Patient Name: JEANCLAUDE LECONTE  Age and : 61y ([birth year])  MRN #: [id number]  Location: [place] [address] [address]   Referring Physician: Richard Libman    Start Time/Date: 08:00 on 22  End Time/Date: 12:23 on 22  Duration: 28 hours  _____________________________________________________________  STUDY INFORMATION    EEG Recording Technique:  The patient underwent continuous Video-EEG monitoring, using Telemetry System hardware on the XLTek Digital System. EEG and video data were stored on a computer hard drive with important events saved in digital archive files. The material was reviewed by a physician (electroencephalographer / epileptologist) on a daily basis. Minor and seizure detection algorithms were utilized and reviewed. An EEG Technician attended to the patient, and was available throughout daytime work hours.  The epilepsy center neurologist was available in person or on call 24-hours per day.    EEG Placement and Labeling of Electrodes:  The EEG was performed utilizing 20 channel referential EEG connections (coronal over temporal over parasagittal montage) using all standard 10-20 electrode placements with EKG, with additional electrodes placed in the inferior temporal region using the modified 10-10 montage electrode placements for elective admissions, or if deemed necessary. Recording was at a sampling rate of 256 samples per second per channel. Time synchronized digital video recording was done simultaneously with EEG recording. A low light infrared camera was used for low light recording.     _____________________________________________________________  HISTORY    Patient is a 61y old  Male who presents with a chief complaint of ICH (2022 07:09)    PERTINENT MEDICATION:  lacosamide 150 milliGRAM(s) Oral two times a day  levETIRAcetam 1000 milliGRAM(s) Oral two times a day    STUDY INTERPRETATION    Findings: The background was continuous and reactive. During wakefulness, the posterior dominant rhythm consisted of asymmetric well-modulated 7 Hz activity seen over the left. No clear posterior dominant rhythm seen over the right.     Background Slowing:  Continuous diffuse theta/delta activity.    Focal Slowing:   Continuous polymorphic theta/delta slowing over the right hemisphere, right parasagittal max.    Sleep Background:  Drowsiness was characterized by fragmentation, attenuation, and slowing of the background activity.    Sleep was characterized by the presence of vertex waves, asymmetric sleep spindles and K-complexes, better seen over the left.    Other Non-Epileptiform Findings:  None were present.    Interictal Epileptiform Activity:   Near continuous fluctuating right parasagittal lateralized periodic discharges (LPDs) up to 2 Hz at times with a right frontal predominance    Seizures:   No seizures seen    Activation Procedures:   Hyperventilation was not performed.    Photic stimulation was performed and did not elicit any abnormality. Photic driving was seen.    Artifacts:  Intermittent myogenic and movement artifacts were noted.    ECG:  The heart rate on single channel ECG was predominantly between 80-90 BPM.    _____________________________________________________________  EEG SUMMARY/CLASSIFICATION    Abnormal EEG in the awake, drowsy and asleep states.    - Near continuous fluctuating right parasagittal LPDs up to 2 Hz   - Continuous slowing over the right hemisphere, right parasagittal max.  - diffuse generalized slowing    _____________________________________________________________  EEG IMPRESSION/CLINICAL CORRELATE    Abnormal EEG study.    - Cortical excitability in the right parasagittal region with the presence of near continuous LPDs, increased risk of seizures from this region.  - Structural / functional abnormality in the right hemisphere, right parasagittal max.   - Mild to moderate nonspecific diffuse or multifocal cerebral dysfunction.   - No seizures seen in the past 48 hours.  ____________    Wale Sullivan MD, ADALGISA  Fellow | [facility]     Scottie Del Valle MD PhD  Director, Epilepsy Division, [facility] EEG Reading Room Ph#: [phone]  Epilepsy Answering Service after 5PM and before 8:30AM: Ph#: [phone]
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
## 2022-04-24 DIAGNOSIS — I10 ESSENTIAL (PRIMARY) HYPERTENSION: ICD-10-CM

## 2022-04-24 DIAGNOSIS — I63.9 CEREBRAL INFARCTION, UNSPECIFIED: ICD-10-CM

## 2022-04-24 LAB
ANION GAP SERPL CALC-SCNC: 13 MMOL/L — SIGNIFICANT CHANGE UP (ref 5–17)
AUTO DIFF PNL BLD: NEGATIVE — SIGNIFICANT CHANGE UP
BUN SERPL-MCNC: 19 MG/DL — SIGNIFICANT CHANGE UP (ref 7–23)
C-ANCA SER-ACNC: NEGATIVE — SIGNIFICANT CHANGE UP
CALCIUM SERPL-MCNC: 9 MG/DL — SIGNIFICANT CHANGE UP (ref 8.4–10.5)
CHLORIDE SERPL-SCNC: 104 MMOL/L — SIGNIFICANT CHANGE UP (ref 96–108)
CO2 SERPL-SCNC: 21 MMOL/L — LOW (ref 22–31)
CREAT SERPL-MCNC: 1.1 MG/DL — SIGNIFICANT CHANGE UP (ref 0.5–1.3)
CULTURE RESULTS: SIGNIFICANT CHANGE UP
CULTURE RESULTS: SIGNIFICANT CHANGE UP
EGFR: 75 ML/MIN/1.73M2 — SIGNIFICANT CHANGE UP
GLUCOSE SERPL-MCNC: 112 MG/DL — HIGH (ref 70–99)
HCT VFR BLD CALC: 34.3 % — LOW (ref 39–50)
HGB BLD-MCNC: 11.2 G/DL — LOW (ref 13–17)
MCHC RBC-ENTMCNC: 27.6 PG — SIGNIFICANT CHANGE UP (ref 27–34)
MCHC RBC-ENTMCNC: 32.7 GM/DL — SIGNIFICANT CHANGE UP (ref 32–36)
MCV RBC AUTO: 84.5 FL — SIGNIFICANT CHANGE UP (ref 80–100)
NRBC # BLD: 0 /100 WBCS — SIGNIFICANT CHANGE UP (ref 0–0)
P-ANCA SER-ACNC: NEGATIVE — SIGNIFICANT CHANGE UP
PLATELET # BLD AUTO: 188 K/UL — SIGNIFICANT CHANGE UP (ref 150–400)
POTASSIUM SERPL-MCNC: 4 MMOL/L — SIGNIFICANT CHANGE UP (ref 3.5–5.3)
POTASSIUM SERPL-SCNC: 4 MMOL/L — SIGNIFICANT CHANGE UP (ref 3.5–5.3)
RBC # BLD: 4.06 M/UL — LOW (ref 4.2–5.8)
RBC # FLD: 14.8 % — HIGH (ref 10.3–14.5)
SODIUM SERPL-SCNC: 138 MMOL/L — SIGNIFICANT CHANGE UP (ref 135–145)
SPECIMEN SOURCE: SIGNIFICANT CHANGE UP
SPECIMEN SOURCE: SIGNIFICANT CHANGE UP
WBC # BLD: 8.39 K/UL — SIGNIFICANT CHANGE UP (ref 3.8–10.5)
WBC # FLD AUTO: 8.39 K/UL — SIGNIFICANT CHANGE UP (ref 3.8–10.5)

## 2022-04-24 PROCEDURE — 99233 SBSQ HOSP IP/OBS HIGH 50: CPT

## 2022-04-24 RX ADMIN — LACOSAMIDE 200 MILLIGRAM(S): 50 TABLET ORAL at 17:30

## 2022-04-24 RX ADMIN — Medication 3 MILLILITER(S): at 17:27

## 2022-04-24 RX ADMIN — AMLODIPINE BESYLATE 5 MILLIGRAM(S): 2.5 TABLET ORAL at 05:37

## 2022-04-24 RX ADMIN — ENOXAPARIN SODIUM 40 MILLIGRAM(S): 100 INJECTION SUBCUTANEOUS at 13:09

## 2022-04-24 RX ADMIN — Medication 300 MILLIGRAM(S): at 21:08

## 2022-04-24 RX ADMIN — Medication 300 MILLIGRAM(S): at 05:36

## 2022-04-24 RX ADMIN — Medication 50 MILLIGRAM(S): at 21:08

## 2022-04-24 RX ADMIN — LEVETIRACETAM 1000 MILLIGRAM(S): 250 TABLET, FILM COATED ORAL at 05:36

## 2022-04-24 RX ADMIN — Medication 300 MILLIGRAM(S): at 13:09

## 2022-04-24 RX ADMIN — Medication 50 MILLIGRAM(S): at 05:36

## 2022-04-24 RX ADMIN — LEVETIRACETAM 1000 MILLIGRAM(S): 250 TABLET, FILM COATED ORAL at 17:27

## 2022-04-24 RX ADMIN — POLYETHYLENE GLYCOL 3350 17 GRAM(S): 17 POWDER, FOR SOLUTION ORAL at 17:27

## 2022-04-24 RX ADMIN — Medication 3 MILLILITER(S): at 11:20

## 2022-04-24 RX ADMIN — Medication 81 MILLIGRAM(S): at 11:20

## 2022-04-24 RX ADMIN — Medication 3 MILLILITER(S): at 23:03

## 2022-04-24 RX ADMIN — LACOSAMIDE 200 MILLIGRAM(S): 50 TABLET ORAL at 05:37
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
## 2022-04-24 NOTE — CONSULT NOTE ADULT - PROBLEM SELECTOR RECOMMENDATION 9
R parietal lobar hemorrhage possibly secondary to chronic hypertension despite its atypical location vs. lower likelihood of amyloid angiopathy    - Conventional angiogram revealed an arterial branch occlusion in territory of hemorrhage, raising possibility of a primary ischemic infarct (perhaps due to intracranial athero vs perhaps less likely ESUS) with hemorrhagic conversion as a competing differential  VEEG showed no seizures x2 days  seizure ppx with keppra and vimpat   TTE - severe LV hypertrophy, LV sys dyfx, normal RV   frequent neuro checks  orders per neuro   PT/OT
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
## 2022-04-25 LAB
ANION GAP SERPL CALC-SCNC: 14 MMOL/L — SIGNIFICANT CHANGE UP (ref 5–17)
BUN SERPL-MCNC: 20 MG/DL — SIGNIFICANT CHANGE UP (ref 7–23)
CALCIUM SERPL-MCNC: 8.8 MG/DL — SIGNIFICANT CHANGE UP (ref 8.4–10.5)
CHLORIDE SERPL-SCNC: 103 MMOL/L — SIGNIFICANT CHANGE UP (ref 96–108)
CO2 SERPL-SCNC: 22 MMOL/L — SIGNIFICANT CHANGE UP (ref 22–31)
CREAT SERPL-MCNC: 1.18 MG/DL — SIGNIFICANT CHANGE UP (ref 0.5–1.3)
CRYOGLOB SERPL-MCNC: NEGATIVE — SIGNIFICANT CHANGE UP
EGFR: 69 ML/MIN/1.73M2 — SIGNIFICANT CHANGE UP
GLUCOSE SERPL-MCNC: 99 MG/DL — SIGNIFICANT CHANGE UP (ref 70–99)
HCT VFR BLD CALC: 33.4 % — LOW (ref 39–50)
HGB BLD-MCNC: 10.9 G/DL — LOW (ref 13–17)
MCHC RBC-ENTMCNC: 27.5 PG — SIGNIFICANT CHANGE UP (ref 27–34)
MCHC RBC-ENTMCNC: 32.6 GM/DL — SIGNIFICANT CHANGE UP (ref 32–36)
MCV RBC AUTO: 84.3 FL — SIGNIFICANT CHANGE UP (ref 80–100)
NRBC # BLD: 0 /100 WBCS — SIGNIFICANT CHANGE UP (ref 0–0)
PLATELET # BLD AUTO: 213 K/UL — SIGNIFICANT CHANGE UP (ref 150–400)
POTASSIUM SERPL-MCNC: 4 MMOL/L — SIGNIFICANT CHANGE UP (ref 3.5–5.3)
POTASSIUM SERPL-SCNC: 4 MMOL/L — SIGNIFICANT CHANGE UP (ref 3.5–5.3)
RBC # BLD: 3.96 M/UL — LOW (ref 4.2–5.8)
RBC # FLD: 14.6 % — HIGH (ref 10.3–14.5)
SARS-COV-2 RNA SPEC QL NAA+PROBE: SIGNIFICANT CHANGE UP
SODIUM SERPL-SCNC: 139 MMOL/L — SIGNIFICANT CHANGE UP (ref 135–145)
WBC # BLD: 7.53 K/UL — SIGNIFICANT CHANGE UP (ref 3.8–10.5)
WBC # FLD AUTO: 7.53 K/UL — SIGNIFICANT CHANGE UP (ref 3.8–10.5)

## 2022-04-25 PROCEDURE — 99222 1ST HOSP IP/OBS MODERATE 55: CPT

## 2022-04-25 RX ORDER — ATORVASTATIN CALCIUM 80 MG/1
40 TABLET, FILM COATED ORAL AT BEDTIME
Refills: 0 | Status: DISCONTINUED | OUTPATIENT
Start: 2022-04-25 | End: 2022-04-26

## 2022-04-25 RX ADMIN — Medication 50 MILLIGRAM(S): at 05:34

## 2022-04-25 RX ADMIN — Medication 50 MILLIGRAM(S): at 13:01

## 2022-04-25 RX ADMIN — Medication 50 MILLIGRAM(S): at 22:01

## 2022-04-25 RX ADMIN — Medication 300 MILLIGRAM(S): at 13:00

## 2022-04-25 RX ADMIN — Medication 300 MILLIGRAM(S): at 05:34

## 2022-04-25 RX ADMIN — LEVETIRACETAM 1000 MILLIGRAM(S): 250 TABLET, FILM COATED ORAL at 05:33

## 2022-04-25 RX ADMIN — LACOSAMIDE 200 MILLIGRAM(S): 50 TABLET ORAL at 05:33

## 2022-04-25 RX ADMIN — LEVETIRACETAM 1000 MILLIGRAM(S): 250 TABLET, FILM COATED ORAL at 17:15

## 2022-04-25 RX ADMIN — Medication 300 MILLIGRAM(S): at 22:01

## 2022-04-25 RX ADMIN — Medication 81 MILLIGRAM(S): at 13:00

## 2022-04-25 RX ADMIN — SENNA PLUS 2 TABLET(S): 8.6 TABLET ORAL at 22:01

## 2022-04-25 RX ADMIN — AMLODIPINE BESYLATE 5 MILLIGRAM(S): 2.5 TABLET ORAL at 05:33

## 2022-04-25 RX ADMIN — ENOXAPARIN SODIUM 40 MILLIGRAM(S): 100 INJECTION SUBCUTANEOUS at 13:00

## 2022-04-25 RX ADMIN — POLYETHYLENE GLYCOL 3350 17 GRAM(S): 17 POWDER, FOR SOLUTION ORAL at 17:17

## 2022-04-25 RX ADMIN — ATORVASTATIN CALCIUM 40 MILLIGRAM(S): 80 TABLET, FILM COATED ORAL at 22:02

## 2022-04-25 RX ADMIN — Medication 3 MILLILITER(S): at 05:33

## 2022-04-25 RX ADMIN — LACOSAMIDE 200 MILLIGRAM(S): 50 TABLET ORAL at 17:58

## 2022-04-25 NOTE — PROGRESS NOTE ADULT - SUBJECTIVE AND OBJECTIVE BOX
Subjective: Patient seen and examined. No new events except as noted.   Remains in Stroke Unit.     REVIEW OF SYSTEMS:    CONSTITUTIONAL: + weakness, fevers or chills  EYES/ENT: No visual changes;  No vertigo or throat pain   NECK: No pain or stiffness  RESPIRATORY: No cough, wheezing, hemoptysis; No shortness of breath  CARDIOVASCULAR: No chest pain or palpitations  GASTROINTESTINAL: No abdominal or epigastric pain. No nausea, vomiting, or hematemesis; No diarrhea or constipation. No melena or hematochezia.  GENITOURINARY: No dysuria, frequency or hematuria  NEUROLOGICAL: No numbness or weakness  SKIN: No itching, burning, rashes, or lesions   All other review of systems is negative unless indicated above.    MEDICATIONS:  MEDICATIONS  (STANDING):  albuterol/ipratropium for Nebulization 3 milliLiter(s) Nebulizer every 6 hours  amLODIPine   Tablet 5 milliGRAM(s) Oral daily  aspirin  chewable 81 milliGRAM(s) Oral daily  atorvastatin 40 milliGRAM(s) Oral at bedtime  enoxaparin Injectable 40 milliGRAM(s) SubCutaneous every 24 hours  hydrALAZINE 50 milliGRAM(s) Oral every 8 hours  labetalol 300 milliGRAM(s) Oral three times a day  lacosamide 200 milliGRAM(s) Oral two times a day  levETIRAcetam 1000 milliGRAM(s) Oral two times a day  polyethylene glycol 3350 17 Gram(s) Oral every 12 hours  senna 2 Tablet(s) Oral at bedtime    PHYSICAL EXAM:  T(C): 37.1 (04-25-22 @ 10:00), Max: 37.3 (04-24-22 @ 20:00)  HR: 80 (04-25-22 @ 10:00) (76 - 92)  BP: 146/74 (04-25-22 @ 10:00) (106/67 - 148/75)  RR: 18 (04-25-22 @ 10:00) (15 - 22)  SpO2: 97% (04-25-22 @ 10:00) (95% - 97%)  Wt(kg): --  I&O's Summary    24 Apr 2022 07:01  -  25 Apr 2022 07:00  --------------------------------------------------------  IN: 1470 mL / OUT: 600 mL / NET: 870 mL    Appearance: NAD	  HEENT: Normal oral mucosa, PERRL, EOMI	  Lymphatic: No lymphadenopathy , no edema  Cardiovascular: Normal S1 S2, No JVD, No murmurs , Peripheral pulses palpable 2+ bilaterally  Respiratory: Lungs clear to auscultation, normal effort 	  Gastrointestinal:  Soft, Non-tender, + BS	  Skin: No rashes, No ecchymoses, No cyanosis, warm to touch  NEUROLOGICAL EXAM:  Mental status: Awake, alert, oriented to self, location "hospital", month "April" and year "2022", has insight into why he is in the hospital, no neglect, no asomatognosia, speech fluent, follows simple commands   Cranial Nerves: R pupil 6mm->4.5mm, L pupil 7mm with no reactivity  to light, visual fields intact, no facial asymmetry, no nystagmus, no dysarthria, tongue midline  Motor exam: B/L UE no drift,  slight LLE drift, RLE no drift, normal tone, no involuntary movements or myoclonic jerks   Sensation: Intact to light touch, +extinction on left with double simultaneous stimulation  Coordination/Gait: No dysmetria, gait not assessed due to fall risk  Ext: No edema    LABS:    CARDIAC MARKERS:                        10.9   7.53  )-----------( 213      ( 25 Apr 2022 06:20 )             33.4     04-25    139  |  103  |  20  ----------------------------<  99  4.0   |  22  |  1.18    Ca    8.8      25 Apr 2022 06:17    proBNP:   Lipid Profile:   HgA1c:   TSH:     TELEMETRY: SR 	    ECG:  	  RADIOLOGY:   DIAGNOSTIC TESTING:  [ ] Echocardiogram:  [ ]  Catheterization:  [ ] Stress Test:    OTHER:
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
## 2022-04-26 ENCOUNTER — TRANSCRIPTION ENCOUNTER (OUTPATIENT)
Age: 62
End: 2022-04-26

## 2022-04-26 ENCOUNTER — INPATIENT (INPATIENT)
Facility: HOSPITAL | Age: 62
LOS: 13 days | Discharge: HOME HEALTH | DRG: 949 | End: 2022-05-10
Attending: STUDENT IN AN ORGANIZED HEALTH CARE EDUCATION/TRAINING PROGRAM | Admitting: STUDENT IN AN ORGANIZED HEALTH CARE EDUCATION/TRAINING PROGRAM
Payer: COMMERCIAL

## 2022-04-26 VITALS
HEART RATE: 83 BPM | TEMPERATURE: 99 F | RESPIRATION RATE: 18 BRPM | SYSTOLIC BLOOD PRESSURE: 127 MMHG | OXYGEN SATURATION: 98 % | DIASTOLIC BLOOD PRESSURE: 83 MMHG

## 2022-04-26 VITALS
DIASTOLIC BLOOD PRESSURE: 82 MMHG | RESPIRATION RATE: 16 BRPM | WEIGHT: 191.36 LBS | HEIGHT: 66 IN | OXYGEN SATURATION: 96 % | SYSTOLIC BLOOD PRESSURE: 142 MMHG | TEMPERATURE: 98 F | HEART RATE: 78 BPM

## 2022-04-26 DIAGNOSIS — I63.9 CEREBRAL INFARCTION, UNSPECIFIED: ICD-10-CM

## 2022-04-26 LAB
ANION GAP SERPL CALC-SCNC: 14 MMOL/L — SIGNIFICANT CHANGE UP (ref 5–17)
BUN SERPL-MCNC: 21 MG/DL — SIGNIFICANT CHANGE UP (ref 7–23)
CALCIUM SERPL-MCNC: 8.8 MG/DL — SIGNIFICANT CHANGE UP (ref 8.4–10.5)
CHLORIDE SERPL-SCNC: 104 MMOL/L — SIGNIFICANT CHANGE UP (ref 96–108)
CO2 SERPL-SCNC: 23 MMOL/L — SIGNIFICANT CHANGE UP (ref 22–31)
CREAT SERPL-MCNC: 1.11 MG/DL — SIGNIFICANT CHANGE UP (ref 0.5–1.3)
EGFR: 75 ML/MIN/1.73M2 — SIGNIFICANT CHANGE UP
GLUCOSE SERPL-MCNC: 104 MG/DL — HIGH (ref 70–99)
HCT VFR BLD CALC: 33.3 % — LOW (ref 39–50)
HGB BLD-MCNC: 10.9 G/DL — LOW (ref 13–17)
MCHC RBC-ENTMCNC: 27.9 PG — SIGNIFICANT CHANGE UP (ref 27–34)
MCHC RBC-ENTMCNC: 32.7 GM/DL — SIGNIFICANT CHANGE UP (ref 32–36)
MCV RBC AUTO: 85.2 FL — SIGNIFICANT CHANGE UP (ref 80–100)
NRBC # BLD: 0 /100 WBCS — SIGNIFICANT CHANGE UP (ref 0–0)
PLATELET # BLD AUTO: 237 K/UL — SIGNIFICANT CHANGE UP (ref 150–400)
POTASSIUM SERPL-MCNC: 4.2 MMOL/L — SIGNIFICANT CHANGE UP (ref 3.5–5.3)
POTASSIUM SERPL-SCNC: 4.2 MMOL/L — SIGNIFICANT CHANGE UP (ref 3.5–5.3)
RBC # BLD: 3.91 M/UL — LOW (ref 4.2–5.8)
RBC # FLD: 14.6 % — HIGH (ref 10.3–14.5)
SODIUM SERPL-SCNC: 141 MMOL/L — SIGNIFICANT CHANGE UP (ref 135–145)
WBC # BLD: 6.95 K/UL — SIGNIFICANT CHANGE UP (ref 3.8–10.5)
WBC # FLD AUTO: 6.95 K/UL — SIGNIFICANT CHANGE UP (ref 3.8–10.5)

## 2022-04-26 PROCEDURE — 99222 1ST HOSP IP/OBS MODERATE 55: CPT

## 2022-04-26 PROCEDURE — 99223 1ST HOSP IP/OBS HIGH 75: CPT

## 2022-04-26 RX ORDER — ENOXAPARIN SODIUM 100 MG/ML
40 INJECTION SUBCUTANEOUS EVERY 24 HOURS
Refills: 0 | Status: DISCONTINUED | OUTPATIENT
Start: 2022-04-26 | End: 2022-05-10

## 2022-04-26 RX ORDER — AMLODIPINE BESYLATE 2.5 MG/1
1 TABLET ORAL
Qty: 0 | Refills: 0 | DISCHARGE
Start: 2022-04-26

## 2022-04-26 RX ORDER — IPRATROPIUM/ALBUTEROL SULFATE 18-103MCG
3 AEROSOL WITH ADAPTER (GRAM) INHALATION
Qty: 0 | Refills: 0 | DISCHARGE
Start: 2022-04-26

## 2022-04-26 RX ORDER — LACOSAMIDE 50 MG/1
1 TABLET ORAL
Qty: 0 | Refills: 0 | DISCHARGE
Start: 2022-04-26

## 2022-04-26 RX ORDER — ATORVASTATIN CALCIUM 80 MG/1
1 TABLET, FILM COATED ORAL
Qty: 0 | Refills: 0 | DISCHARGE
Start: 2022-04-26

## 2022-04-26 RX ORDER — LACOSAMIDE 50 MG/1
200 TABLET ORAL
Refills: 0 | Status: DISCONTINUED | OUTPATIENT
Start: 2022-04-26 | End: 2022-05-03

## 2022-04-26 RX ORDER — ACETAMINOPHEN 500 MG
650 TABLET ORAL EVERY 6 HOURS
Refills: 0 | Status: DISCONTINUED | OUTPATIENT
Start: 2022-04-26 | End: 2022-05-10

## 2022-04-26 RX ORDER — ATORVASTATIN CALCIUM 80 MG/1
40 TABLET, FILM COATED ORAL AT BEDTIME
Refills: 0 | Status: DISCONTINUED | OUTPATIENT
Start: 2022-04-26 | End: 2022-05-10

## 2022-04-26 RX ORDER — POLYETHYLENE GLYCOL 3350 17 G/17G
17 POWDER, FOR SOLUTION ORAL EVERY 12 HOURS
Refills: 0 | Status: DISCONTINUED | OUTPATIENT
Start: 2022-04-26 | End: 2022-05-10

## 2022-04-26 RX ORDER — ALBUTEROL 90 UG/1
1 AEROSOL, METERED ORAL EVERY 6 HOURS
Refills: 0 | Status: DISCONTINUED | OUTPATIENT
Start: 2022-04-26 | End: 2022-04-27

## 2022-04-26 RX ORDER — IPRATROPIUM/ALBUTEROL SULFATE 18-103MCG
3 AEROSOL WITH ADAPTER (GRAM) INHALATION EVERY 6 HOURS
Refills: 0 | Status: DISCONTINUED | OUTPATIENT
Start: 2022-04-26 | End: 2022-04-27

## 2022-04-26 RX ORDER — LABETALOL HCL 100 MG
300 TABLET ORAL THREE TIMES A DAY
Refills: 0 | Status: DISCONTINUED | OUTPATIENT
Start: 2022-04-26 | End: 2022-05-10

## 2022-04-26 RX ORDER — ASPIRIN/CALCIUM CARB/MAGNESIUM 324 MG
81 TABLET ORAL DAILY
Refills: 0 | Status: DISCONTINUED | OUTPATIENT
Start: 2022-04-26 | End: 2022-05-10

## 2022-04-26 RX ORDER — LABETALOL HCL 100 MG
1 TABLET ORAL
Qty: 0 | Refills: 0 | DISCHARGE
Start: 2022-04-26

## 2022-04-26 RX ORDER — ASPIRIN/CALCIUM CARB/MAGNESIUM 324 MG
1 TABLET ORAL
Qty: 0 | Refills: 0 | DISCHARGE
Start: 2022-04-26

## 2022-04-26 RX ORDER — ACETYLCYSTEINE 200 MG/ML
4 VIAL (ML) MISCELLANEOUS EVERY 6 HOURS
Refills: 0 | Status: DISCONTINUED | OUTPATIENT
Start: 2022-04-26 | End: 2022-05-03

## 2022-04-26 RX ORDER — LEVETIRACETAM 250 MG/1
1000 TABLET, FILM COATED ORAL
Refills: 0 | Status: DISCONTINUED | OUTPATIENT
Start: 2022-04-26 | End: 2022-05-10

## 2022-04-26 RX ORDER — AMLODIPINE BESYLATE 2.5 MG/1
5 TABLET ORAL DAILY
Refills: 0 | Status: DISCONTINUED | OUTPATIENT
Start: 2022-04-26 | End: 2022-05-04

## 2022-04-26 RX ORDER — LEVETIRACETAM 250 MG/1
1 TABLET, FILM COATED ORAL
Qty: 0 | Refills: 0 | DISCHARGE
Start: 2022-04-26

## 2022-04-26 RX ORDER — HYDRALAZINE HCL 50 MG
1 TABLET ORAL
Qty: 0 | Refills: 0 | DISCHARGE
Start: 2022-04-26

## 2022-04-26 RX ORDER — SENNA PLUS 8.6 MG/1
2 TABLET ORAL AT BEDTIME
Refills: 0 | Status: DISCONTINUED | OUTPATIENT
Start: 2022-04-26 | End: 2022-05-10

## 2022-04-26 RX ORDER — HYDRALAZINE HCL 50 MG
50 TABLET ORAL EVERY 8 HOURS
Refills: 0 | Status: DISCONTINUED | OUTPATIENT
Start: 2022-04-26 | End: 2022-05-10

## 2022-04-26 RX ADMIN — Medication 300 MILLIGRAM(S): at 21:03

## 2022-04-26 RX ADMIN — Medication 50 MILLIGRAM(S): at 13:56

## 2022-04-26 RX ADMIN — LACOSAMIDE 200 MILLIGRAM(S): 50 TABLET ORAL at 05:31

## 2022-04-26 RX ADMIN — LEVETIRACETAM 1000 MILLIGRAM(S): 250 TABLET, FILM COATED ORAL at 05:28

## 2022-04-26 RX ADMIN — Medication 50 MILLIGRAM(S): at 21:03

## 2022-04-26 RX ADMIN — AMLODIPINE BESYLATE 5 MILLIGRAM(S): 2.5 TABLET ORAL at 05:29

## 2022-04-26 RX ADMIN — Medication 300 MILLIGRAM(S): at 13:56

## 2022-04-26 RX ADMIN — POLYETHYLENE GLYCOL 3350 17 GRAM(S): 17 POWDER, FOR SOLUTION ORAL at 05:30

## 2022-04-26 RX ADMIN — LEVETIRACETAM 1000 MILLIGRAM(S): 250 TABLET, FILM COATED ORAL at 18:21

## 2022-04-26 RX ADMIN — LACOSAMIDE 200 MILLIGRAM(S): 50 TABLET ORAL at 18:21

## 2022-04-26 RX ADMIN — Medication 3 MILLILITER(S): at 05:29

## 2022-04-26 RX ADMIN — ATORVASTATIN CALCIUM 40 MILLIGRAM(S): 80 TABLET, FILM COATED ORAL at 21:03

## 2022-04-26 RX ADMIN — Medication 300 MILLIGRAM(S): at 05:29

## 2022-04-26 RX ADMIN — Medication 50 MILLIGRAM(S): at 05:29

## 2022-04-26 RX ADMIN — ENOXAPARIN SODIUM 40 MILLIGRAM(S): 100 INJECTION SUBCUTANEOUS at 13:56

## 2022-04-26 RX ADMIN — Medication 81 MILLIGRAM(S): at 12:26

## 2022-04-26 RX ADMIN — SENNA PLUS 2 TABLET(S): 8.6 TABLET ORAL at 21:03

## 2022-04-26 NOTE — PROGRESS NOTE ADULT - PROBLEM SELECTOR PLAN 1
R parietal lobar hemorrhage possibly secondary to chronic hypertension despite its atypical location vs. lower likelihood of amyloid angiopathy    - Conventional angiogram revealed an arterial branch occlusion in territory of hemorrhage, raising possibility of a primary ischemic infarct (perhaps due to intracranial athero vs perhaps less likely ESUS) with hemorrhagic conversion as a competing differential  VEEG showed no seizures x2 days  seizure ppx with keppra and vimpat   will need ILR   TTE - severe LV hypertrophy, LV sys dyfx, normal RV   frequent neuro checks  aspiration precautions  orders per neuro   PT/OT
R parietal lobar hemorrhage possibly secondary to chronic hypertension despite its atypical location vs. lower likelihood of amyloid angiopathy    - Conventional angiogram revealed an arterial branch occlusion in territory of hemorrhage, raising possibility of a primary ischemic infarct (perhaps due to intracranial athero vs perhaps less likely ESUS) with hemorrhagic conversion as a competing differential  VEEG showed no seizures x2 days  seizure ppx with keppra and vimpat   will need ILR   TTE - severe LV hypertrophy, LV sys dyfx, normal RV   frequent neuro checks  aspiration precautions  orders per neuro   PT/OT

## 2022-04-26 NOTE — DISCHARGE NOTE PROVIDER - NSDCMRMEDTOKEN_GEN_ALL_CORE_FT
amLODIPine 5 mg oral tablet: 1 tab(s) orally once a day  aspirin 81 mg oral tablet, chewable: 1 tab(s) orally once a day  atorvastatin 40 mg oral tablet: 1 tab(s) orally once a day (at bedtime)  hydrALAZINE 50 mg oral tablet: 1 tab(s) orally every 8 hours  ipratropium-albuterol 0.5 mg-2.5 mg/3 mL inhalation solution: 3 milliliter(s) inhaled every 6 hours  labetalol 300 mg oral tablet: 1 tab(s) orally 3 times a day  lacosamide 200 mg oral tablet: 1 tab(s) orally 2 times a day  levETIRAcetam 1000 mg oral tablet: 1 tab(s) orally 2 times a day

## 2022-04-26 NOTE — H&P ADULT - NSHPREVIEWOFSYSTEMS_GEN_ALL_CORE
REVIEW OF SYSTEMS  Constitutional: No fever, No Chills, No fatigue  HEENT: No eye pain, +L-sided vision deficits  Pulm: No cough,  No shortness of breath  Cardio: No chest pain, No palpitations  GI:  No abdominal pain, No nausea, No vomiting, No diarrhea, No constipation  : No dysuria, No frequency, No hematuria  Neuro: No headaches, No memory loss, +Weakness, No numbness, No tremors  Skin: No itching, No rashes, No lesions   Endo: No temperature intolerance  MSK: No joint pain, No joint swelling, No muscle pain, No Neck or back pain  Psych:  No depression, No anxiety
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
## 2022-04-26 NOTE — PROGRESS NOTE ADULT - TIME BILLING
Diagnosis and treatment plan; counselling for secondary stroke prevention  Agree with above; ROS otherwise negative
Diagnosis and treatment plan; counselling for secondary stroke prevention  Agree with above; ROS otherwise negative
Care plan created and coordinated
Diagnosis and treatment plan; counselling for secondary stroke prevention  Agree with above; ROS otherwise negative.
Advanced care planning was discussed with patient and family.  Advanced care planning forms were reviewed and discussed as appropriate.  Differential diagnosis and plan of care discussed with patient after the evaluation.   Pain assessed and judicious use of narcotics when appropriate was discussed.  Importance of Fall prevention discussed.  Counseling on Smoking and Alcohol cessation was offered when appropriate.  Counseling on Diet, exercise, and medication compliance was done.
Diagnosis and treatment plan; counselling for secondary stroke prevention  Agree with above; ROS otherwise negative
-review of the history and records  -general and neurological examination  -generation of assessment and treatment plan  -communication with the patient/family members  -coordination of care.
Diagnosis and treatment plan; counselling for secondary stroke prevention  Agree with above; ROS otherwise negative

## 2022-04-26 NOTE — PROGRESS NOTE ADULT - PROVIDER SPECIALTY LIST ADULT
NSICU
NSICU
Neurology
NSICU
Neurology
Neurology
Neurosurgery
Neurosurgery
Cardiology
NSICU
Neurology
NSICU
Neurology
NSICU
Cardiology

## 2022-04-26 NOTE — DISCHARGE NOTE PROVIDER - NSDCCPCAREPLAN_GEN_ALL_CORE_FT
PRINCIPAL DISCHARGE DIAGNOSIS  Diagnosis: Cerebral infarction  Assessment and Plan of Treatment: 1) Continue taking aspirin 81 mg once a day for secondary stroke prevention  2) Continue taking vimpat 200 mg twice a day and keppra 1000 mg twice a day for seizure prevention.   3) Continue taking norvasc 5 mg once a day, hydralazine 50 mg three times a day, and labatelol 300 mg three times a day for blood pressure control.   4) Continue all other home medications as prescribed.  5) Follow up with your neurologist within 1 month  6) Follow up with your PCP within 1 month

## 2022-04-26 NOTE — H&P ADULT - NSHPLABSRESULTS_GEN_ALL_CORE
MR Head w/wo IV Cont (04.18.22 @ 15:28)  IMPRESSION: Right parenchymal occipital hemorrhage without significant   contrast enhancement with multiple tiny scattered foci of punctate   infarction on a background of fairly extensive white matter ischemia   could be related to bacterial endocarditis, hypertension or cardioembolic   source. Recommend follow-up to resolution.    CT Head No Cont (04.18.22 @ 10:36)   IMPRESSION:  Similar-appearing 2.9 x 2.7 cm right mesial parietal parenchymal   hemorrhage with surrounding edema and local mass effect.  No midline shift or effacement of the basal cisterns. No hydrocephalus.    EEG 4/22  EEG SUMMARY/CLASSIFICATION  Abnormal EEG in the awake, drowsy and asleep states.  - Frequent focal seizures from right parasagittal region, improving after noon with escalation of AEDs.  - Intermittent fluctuating right hemispheric LPDs up to 1.5 Hz  - Continuous polymorphic theta/delta slowing over the right hemisphere, parietocentral max.  - Background slowing, generalized.    _____________________________________________________________  EEG IMPRESSION/CLINICAL CORRELATE  Abnormal EEG study.  - Frequent focal right parasagittal seizures, some associated with LUE clonic jerks while majority were subclinical, with no further discrete seizures after 12:15 on 4/20/22. There remains risk of recurrent seizures from the same region.  - Structural / functional abnormality in the right hemisphere, parietocentral max.   - Mild nonspecific diffuse or multifocal cerebral dysfunction.     EEG 4/23/22  EEG SUMMARY/CLASSIFICATION  Abnormal EEG in the awake, drowsy and asleep states.  - Near continuous fluctuating right parasagittal LPDs up to 2 Hz   - Continuous slowing over the right hemisphere, right parasagittal max.  - diffuse generalized slowing    _____________________________________________________________  EEG IMPRESSION/CLINICAL CORRELATE  Abnormal EEG study.  - Cortical excitability in the right parasagittal region with the presence of near continuous LPDs, increased risk of seizures from this region.  - Structural / functional abnormality in the right hemisphere, right parasagittal max.   - Mild to moderate nonspecific diffuse or multifocal cerebral dysfunction.   - No seizures seen in the past 48 hours.

## 2022-04-26 NOTE — H&P ADULT - ATTENDING COMMENTS
Pt. seen with resident on admission.  Agree with documentation above as per resident with amendments made as appropriate. Patient medically stable. Appropriate for acute rehabilitation.     right parietal ICH  cognitive deficits,  seizures,    cont. current meds

## 2022-04-26 NOTE — DISCHARGE NOTE PROVIDER - CARE PROVIDER_API CALL
Rob Caldera)  Neurology; Vascular Neurology  [address] [facility] [address], [address]  [place] [address]  Phone: [phone]  Fax: [phone]  Follow Up Time: 1 month    Rolf Martin ()  Cardiology; Internal Medicine  [address] [facility] [address], [address]  [place] [address]  Phone: [phone]  Fax: [phone]  Follow Up Time: 1 month   Rob Caldera)  Neurology; Vascular Neurology  [address] [facility], [address]  [place] [address]  Phone: [phone]  Fax: [phone]  Follow Up Time: 1 month    Rlof Martin ()  Cardiology; Internal Medicine  [address] [facility], [address]  [place] [address]  Phone: [phone]  Fax: [phone]  Follow Up Time: 1 month    Matthew Ashton)  Neurosurgery  [address] [facility]  [place] [address]  Phone: [phone]  Fax: [phone]  Follow Up Time: 1 month

## 2022-04-26 NOTE — DISCHARGE NOTE NURSING/CASE MANAGEMENT/SOCIAL WORK - NSDCPEFALRISK_GEN_ALL_CORE
For information on Fall & Injury Prevention, visit: [email or web address] OR  [email or web address] OR  https://www.cdc.gov/steadi/patient.html

## 2022-04-26 NOTE — PATIENT PROFILE ADULT - FALL HARM RISK - HARM RISK INTERVENTIONS
Assistance with ambulation/Assistance OOB with selected safe patient handling equipment/Communicate Risk of Fall with Harm to all staff/Discuss with provider need for PT consult/Monitor gait and stability/Reinforce activity limits and safety measures with patient and family/Tailored Fall Risk Interventions/Use of alarms - bed, chair and/or voice tab/Visual Cue: Yellow wristband and red socks/Bed in lowest position, wheels locked, appropriate side rails in place/Call bell, personal items and telephone in reach/Instruct patient to call for assistance before getting out of bed or chair/Non-slip footwear when patient is out of bed/[place] to call system/Physically safe environment - no spills, clutter or unnecessary equipment/Purposeful Proactive Rounding/Room/bathroom lighting operational, light cord in reach
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
## 2022-04-26 NOTE — DISCHARGE NOTE PROVIDER - CARE PROVIDERS DIRECT ADDRESSES
,DirectAddress_Unknown,DirectAddress_Unknown ,DirectAddress_Unknown,DirectAddress_Unknown,[email or web address]

## 2022-04-26 NOTE — PROGRESS NOTE ADULT - SUBJECTIVE AND OBJECTIVE BOX
Subjective: Patient seen and examined. No new events except as noted.     REVIEW OF SYSTEMS:    CONSTITUTIONAL: + weakness, fevers or chills  EYES/ENT: No visual changes;  No vertigo or throat pain   NECK: No pain or stiffness  RESPIRATORY: No cough, wheezing, hemoptysis; No shortness of breath  CARDIOVASCULAR: No chest pain or palpitations  GASTROINTESTINAL: No abdominal or epigastric pain. No nausea, vomiting, or hematemesis; No diarrhea or constipation. No melena or hematochezia.  GENITOURINARY: No dysuria, frequency or hematuria  NEUROLOGICAL: No numbness or weakness  SKIN: No itching, burning, rashes, or lesions   All other review of systems is negative unless indicated above.    MEDICATIONS:  MEDICATIONS  (STANDING):  albuterol/ipratropium for Nebulization 3 milliLiter(s) Nebulizer every 6 hours  amLODIPine   Tablet 5 milliGRAM(s) Oral daily  aspirin  chewable 81 milliGRAM(s) Oral daily  atorvastatin 40 milliGRAM(s) Oral at bedtime  enoxaparin Injectable 40 milliGRAM(s) SubCutaneous every 24 hours  hydrALAZINE 50 milliGRAM(s) Oral every 8 hours  labetalol 300 milliGRAM(s) Oral three times a day  lacosamide 200 milliGRAM(s) Oral two times a day  levETIRAcetam 1000 milliGRAM(s) Oral two times a day  polyethylene glycol 3350 17 Gram(s) Oral every 12 hours  senna 2 Tablet(s) Oral at bedtime    PHYSICAL EXAM:  T(C): 37.2 (04-26-22 @ 05:00), Max: 37.5 (04-26-22 @ 01:11)  HR: 88 (04-26-22 @ 05:00) (85 - 93)  BP: 139/81 (04-26-22 @ 05:00) (129/77 - 158/86)  RR: 18 (04-26-22 @ 05:00) (18 - 18)  SpO2: 95% (04-26-22 @ 05:00) (94% - 97%)  Wt(kg): --  I&O's Summary    25 Apr 2022 07:01  -  26 Apr 2022 07:00  --------------------------------------------------------  IN: 1460 mL / OUT: 1000 mL / NET: 460 mL    Appearance: NAD	  HEENT: Normal oral mucosa, PERRL, EOMI	  Lymphatic: No lymphadenopathy , no edema  Cardiovascular: Normal S1 S2, No JVD, No murmurs , Peripheral pulses palpable 2+ bilaterally  Respiratory: Lungs clear to auscultation, normal effort 	  Gastrointestinal:  Soft, Non-tender, + BS	  Skin: No rashes, No ecchymoses, No cyanosis, warm to touch  NEUROLOGICAL EXAM:  Mental status: Awake, alert, oriented to person, place, time, events, follows all commands  Cranial Nerves: subtle right facial palsy which corrects on smiling, no nystagmus, no dysarthria, tongue midline  Motor exam: LUE parietal drift, LLE no drift, RUE/RLE 5/5 no drift with corrected position   Sensation: Intact to light touch, +extinction on left with double simultaneous stimulation  Coordination/Gait: No dysmetria, gait not assessed due to fall risk  Ext: No edema    LABS:    CARDIAC MARKERS:                        10.9   6.95  )-----------( 237      ( 26 Apr 2022 06:31 )             33.3     04-26    141  |  104  |  21  ----------------------------<  104<H>  4.2   |  23  |  1.11    Ca    8.8      26 Apr 2022 06:31    proBNP:   Lipid Profile:   HgA1c:   TSH:     TELEMETRY: SR	    ECG:  	  RADIOLOGY:   DIAGNOSTIC TESTING:  [ ] Echocardiogram:  [ ]  Catheterization:  [ ] Stress Test:    OTHER:

## 2022-04-26 NOTE — H&P ADULT - NSHPPHYSICALEXAM_GEN_ALL_CORE
Constitutional - NAD, Comfortable  HEENT - NCAT, EOMI  Neck - Supple, No limited ROM  Chest - good chest expansion, good respiratory effort, CTAB   Cardio - warm and well perfused, RRR, no murmur  Abdomen -  Soft, NTND  Extremities - No peripheral edema, No calf tenderness   Neurologic Exam:                    Cognitive -             Orientation: Awake, Alert, AAO to self, place, date, year, situation            Attention:  Attention impaired, recall 1/3 objects without cuing, 3/3 with cues. Difficulty naming days of week backwards. Serial 7s x0            Memory: Recent/ remote memory impaired            Thought: process, content appropriate     Speech - Fluent, Comprehensible, Mild dysarthria, No aphasia      Cranial Nerves - No facial asymmetry, Tongue midline, EOMI, Shoulder shrug intact +Dysphagia +Mild Dysarthria     Motor -                      LEFT    UE - ShAB 5/5, EF 5/5, EE 5/5, WE 5/5,  WNL                    RIGHT UE - ShAB 5/5, EF 5/5, EE 5/5, WE 5/5,  WNL                    LEFT    LE - HF 4/5, KE 5/5, DF 5/5, PF 5/5                    RIGHT LE - HF 4/5, KE 5/5, DF 5/5, PF 5/5        Sensory - Intact to LT bilateral     Reflexes - DTR intact, 2-3 beat clonus R ankle     Coordination - FTN / HTS intact     OculoVestibular -  No nystagmus  Psychiatric - Mood stable, Affect WNL  Skin on admission: No active issues Constitutional - NAD, Comfortable  HEENT - NCAT, EOMI  Neck - Supple, No limited ROM  Chest - good chest expansion, good respiratory effort, CTAB   Cardio - warm and well perfused, RRR, no murmur  Abdomen -  Soft, NTND  Extremities - No peripheral edema, No calf tenderness   Neurologic Exam:                    Cognitive -             Orientation: Awake, Alert, AAO to self, place, date, year, situation            Attention:  Attention impaired, recall 1/3 objects without cuing, 3/3 with cues. Difficulty naming days of week backwards. Serial 7s x0            Memory: Recent/ remote memory impaired            Thought: process, content appropriate     Speech - Fluent, Comprehensible, Mild dysarthria, No aphasia      Cranial Nerves - No facial asymmetry, Tongue midline, EOMI, Shoulder shrug intact +Dysphagia +Mild Dysarthria. VF grossly intact     Motor -                      LEFT    UE - ShAB 5/5, EF 5/5, EE 5/5, WE 5/5,  WNL                    RIGHT UE - ShAB 5/5, EF 5/5, EE 5/5, WE 5/5,  WNL                    LEFT    LE - HF 4/5, KE 5/5, DF 5/5, PF 5/5                    RIGHT LE - HF 4/5, KE 5/5, DF 5/5, PF 5/5        Sensory - Intact to LT bilateral     Reflexes - DTR intact, 2-3 beat clonus R ankle     Coordination - FTN / HTS intact     OculoVestibular -  No nystagmus. VF grossly intact  Psychiatric - Mood stable, Affect WNL  Skin on admission: No active issues Constitutional - NAD, Comfortable  HEENT - NCAT, EOMI  Neck - Supple, No limited ROM  Chest - good chest expansion, good respiratory effort, CTAB   Cardio - warm and well perfused, RRR, no murmur  Abdomen -  Soft, NTND  Extremities - No peripheral edema, No calf tenderness   Neurologic Exam:                    Cognitive -             Orientation: Awake, Alert, AAO to self, place, date, year, situation            Attention:  Attention impaired, Difficulty naming days of week backwards. Serial 7s x0            Memory:  impaired--Recall 1/3 spontaneously after few mins.  3/3 with cat cues                 Speech - Fluent, Comprehensible, Mild dysarthria, No aphasia      Cranial Nerves - No facial asymmetry, Tongue midline, EOMI, Shoulder shrug intact +Dysphagia +Mild Dysarthria. VF grossly intact     Motor -                      LEFT    UE - ShAB 5/5, EF 5/5, EE 5/5, WE 5/5,  WNL                    RIGHT UE - ShAB 5/5, EF 5/5, EE 5/5, WE 5/5,  WNL                    LEFT    LE - HF 4/5, KE 5/5, DF 5/5, PF 5/5                    RIGHT LE - HF 4/5, KE 5/5, DF 5/5, PF 5/5        Sensory - Intact to LT bilateral     Reflexes - DTR intact, 2-3 beat clonus R ankle     Coordination - FTN / HTS intact     OculoVestibular -  No nystagmus. VF grossly intact  Psychiatric - Mood stable, Affect WNL  Skin on admission: No active issues

## 2022-04-26 NOTE — DISCHARGE NOTE PROVIDER - PROVIDER TOKENS
PROVIDER:[TOKEN:[[id number]],FOLLOWUP:[1 month]],PROVIDER:[TOKEN:[[id number]],FOLLOWUP:[1 month]] PROVIDER:[TOKEN:[[id number]],FOLLOWUP:[1 month]],PROVIDER:[TOKEN:[[id number]],FOLLOWUP:[1 month]],PROVIDER:[TOKEN:[[id number]],FOLLOWUP:[1 month]]

## 2022-04-26 NOTE — H&P ADULT - NSHPSOCIALHISTORY_GEN_ALL_CORE
SOCIAL HISTORY  Smoking - Denies  EtOH - Denies  Illicit drugs - Denies     FUNCTIONAL HISTORY  Patient lives with his wife in a private house, no stairs  Independent AMB and ADLs PTA, works in security     Current Functional Status:  Bed mobility: Mod  Transfers: Min   Gait / ambulation: Min  ADL's: Max  Speech: +Dysphagia SOCIAL HISTORY  Smoking - 20 pack year smoker, quit 10 years ago  EtOH - Denies  Illicit drugs - Remote history of cocaine and marijuana use, last >10 years ago    FUNCTIONAL HISTORY  Patient lives with his wife in a 2F apartment with 1F to enter  Independent AMB and ADLs PTA, works in security     Current Functional Status:  Bed mobility: Mod  Transfers: Min   Gait / ambulation: Min  ADL's: Max  Speech: +Dysphagia

## 2022-04-26 NOTE — DISCHARGE NOTE NURSING/CASE MANAGEMENT/SOCIAL WORK - PATIENT PORTAL LINK FT
You can access the FollowMyHealth Patient Portal offered by [facility] by registering at the following website: [email or web address]. By joining [employer]’s FollowMyHealth portal, you will also be able to view your health information using other applications (apps) compatible with our system.

## 2022-04-27 LAB
% ALBUMIN: 52.5 % — SIGNIFICANT CHANGE UP
% ALPHA 1: 5 % — SIGNIFICANT CHANGE UP
% ALPHA 2: 14.4 % — SIGNIFICANT CHANGE UP
% BETA: 12.2 % — SIGNIFICANT CHANGE UP
% GAMMA: 15.9 % — SIGNIFICANT CHANGE UP
ALBUMIN SERPL ELPH-MCNC: 2.7 G/DL — LOW (ref 3.3–5)
ALBUMIN SERPL ELPH-MCNC: 3.5 G/DL — LOW (ref 3.6–5.5)
ALBUMIN/GLOB SERPL ELPH: 1.1 RATIO — SIGNIFICANT CHANGE UP
ALP SERPL-CCNC: 52 U/L — SIGNIFICANT CHANGE UP (ref 40–120)
ALPHA1 GLOB SERPL ELPH-MCNC: 0.3 G/DL — SIGNIFICANT CHANGE UP (ref 0.1–0.4)
ALPHA2 GLOB SERPL ELPH-MCNC: 1 G/DL — SIGNIFICANT CHANGE UP (ref 0.5–1)
ALT FLD-CCNC: 52 U/L — HIGH (ref 10–45)
ANION GAP SERPL CALC-SCNC: 7 MMOL/L — SIGNIFICANT CHANGE UP (ref 5–17)
AST SERPL-CCNC: 30 U/L — SIGNIFICANT CHANGE UP (ref 10–40)
B-GLOBULIN SERPL ELPH-MCNC: 0.8 G/DL — SIGNIFICANT CHANGE UP (ref 0.5–1)
BASOPHILS # BLD AUTO: 0.03 K/UL — SIGNIFICANT CHANGE UP (ref 0–0.2)
BASOPHILS NFR BLD AUTO: 0.4 % — SIGNIFICANT CHANGE UP (ref 0–2)
BILIRUB SERPL-MCNC: 0.4 MG/DL — SIGNIFICANT CHANGE UP (ref 0.2–1.2)
BUN SERPL-MCNC: 25 MG/DL — HIGH (ref 7–23)
CALCIUM SERPL-MCNC: 8.7 MG/DL — SIGNIFICANT CHANGE UP (ref 8.4–10.5)
CHLORIDE SERPL-SCNC: 105 MMOL/L — SIGNIFICANT CHANGE UP (ref 96–108)
CO2 SERPL-SCNC: 28 MMOL/L — SIGNIFICANT CHANGE UP (ref 22–31)
CREAT SERPL-MCNC: 1.26 MG/DL — SIGNIFICANT CHANGE UP (ref 0.5–1.3)
EGFR: 64 ML/MIN/1.73M2 — SIGNIFICANT CHANGE UP
EOSINOPHIL # BLD AUTO: 0.12 K/UL — SIGNIFICANT CHANGE UP (ref 0–0.5)
EOSINOPHIL NFR BLD AUTO: 1.6 % — SIGNIFICANT CHANGE UP (ref 0–6)
GAMMA GLOBULIN: 1.1 G/DL — SIGNIFICANT CHANGE UP (ref 0.6–1.6)
GLUCOSE SERPL-MCNC: 99 MG/DL — SIGNIFICANT CHANGE UP (ref 70–99)
HCT VFR BLD CALC: 32.7 % — LOW (ref 39–50)
HCV AB S/CO SERPL IA: 0.11 S/CO — SIGNIFICANT CHANGE UP (ref 0–0.99)
HCV AB SERPL-IMP: SIGNIFICANT CHANGE UP
HGB BLD-MCNC: 10.7 G/DL — LOW (ref 13–17)
IMM GRANULOCYTES NFR BLD AUTO: 0.8 % — SIGNIFICANT CHANGE UP (ref 0–1.5)
INTERPRETATION SERPL IFE-IMP: SIGNIFICANT CHANGE UP
LYMPHOCYTES # BLD AUTO: 1.9 K/UL — SIGNIFICANT CHANGE UP (ref 1–3.3)
LYMPHOCYTES # BLD AUTO: 25.5 % — SIGNIFICANT CHANGE UP (ref 13–44)
MCHC RBC-ENTMCNC: 27.5 PG — SIGNIFICANT CHANGE UP (ref 27–34)
MCHC RBC-ENTMCNC: 32.7 GM/DL — SIGNIFICANT CHANGE UP (ref 32–36)
MCV RBC AUTO: 84.1 FL — SIGNIFICANT CHANGE UP (ref 80–100)
MONOCYTES # BLD AUTO: 0.94 K/UL — HIGH (ref 0–0.9)
MONOCYTES NFR BLD AUTO: 12.6 % — SIGNIFICANT CHANGE UP (ref 2–14)
NEUTROPHILS # BLD AUTO: 4.39 K/UL — SIGNIFICANT CHANGE UP (ref 1.8–7.4)
NEUTROPHILS NFR BLD AUTO: 59.1 % — SIGNIFICANT CHANGE UP (ref 43–77)
NRBC # BLD: 0 /100 WBCS — SIGNIFICANT CHANGE UP (ref 0–0)
PLATELET # BLD AUTO: 228 K/UL — SIGNIFICANT CHANGE UP (ref 150–400)
POTASSIUM SERPL-MCNC: 3.9 MMOL/L — SIGNIFICANT CHANGE UP (ref 3.5–5.3)
POTASSIUM SERPL-SCNC: 3.9 MMOL/L — SIGNIFICANT CHANGE UP (ref 3.5–5.3)
PROT PATTERN SERPL ELPH-IMP: SIGNIFICANT CHANGE UP
PROT SERPL-MCNC: 6.5 G/DL — SIGNIFICANT CHANGE UP (ref 6–8.3)
RBC # BLD: 3.89 M/UL — LOW (ref 4.2–5.8)
RBC # FLD: 14.7 % — HIGH (ref 10.3–14.5)
SODIUM SERPL-SCNC: 140 MMOL/L — SIGNIFICANT CHANGE UP (ref 135–145)
VZV DNA, PCR RESULT: NEGATIVE — SIGNIFICANT CHANGE UP
WBC # BLD: 7.44 K/UL — SIGNIFICANT CHANGE UP (ref 3.8–10.5)
WBC # FLD AUTO: 7.44 K/UL — SIGNIFICANT CHANGE UP (ref 3.8–10.5)

## 2022-04-27 PROCEDURE — 99223 1ST HOSP IP/OBS HIGH 75: CPT

## 2022-04-27 PROCEDURE — 99232 SBSQ HOSP IP/OBS MODERATE 35: CPT

## 2022-04-27 RX ORDER — THIAMINE MONONITRATE (VIT B1) 100 MG
100 TABLET ORAL DAILY
Refills: 0 | Status: DISCONTINUED | OUTPATIENT
Start: 2022-04-27 | End: 2022-05-10

## 2022-04-27 RX ORDER — FOLIC ACID 0.8 MG
1 TABLET ORAL DAILY
Refills: 0 | Status: DISCONTINUED | OUTPATIENT
Start: 2022-04-27 | End: 2022-05-10

## 2022-04-27 RX ORDER — IPRATROPIUM/ALBUTEROL SULFATE 18-103MCG
3 AEROSOL WITH ADAPTER (GRAM) INHALATION EVERY 6 HOURS
Refills: 0 | Status: DISCONTINUED | OUTPATIENT
Start: 2022-04-27 | End: 2022-05-03

## 2022-04-27 RX ADMIN — LACOSAMIDE 200 MILLIGRAM(S): 50 TABLET ORAL at 05:30

## 2022-04-27 RX ADMIN — Medication 50 MILLIGRAM(S): at 21:21

## 2022-04-27 RX ADMIN — Medication 300 MILLIGRAM(S): at 21:21

## 2022-04-27 RX ADMIN — POLYETHYLENE GLYCOL 3350 17 GRAM(S): 17 POWDER, FOR SOLUTION ORAL at 05:29

## 2022-04-27 RX ADMIN — LEVETIRACETAM 1000 MILLIGRAM(S): 250 TABLET, FILM COATED ORAL at 18:37

## 2022-04-27 RX ADMIN — AMLODIPINE BESYLATE 5 MILLIGRAM(S): 2.5 TABLET ORAL at 05:30

## 2022-04-27 RX ADMIN — ENOXAPARIN SODIUM 40 MILLIGRAM(S): 100 INJECTION SUBCUTANEOUS at 12:51

## 2022-04-27 RX ADMIN — Medication 50 MILLIGRAM(S): at 14:41

## 2022-04-27 RX ADMIN — LACOSAMIDE 200 MILLIGRAM(S): 50 TABLET ORAL at 18:37

## 2022-04-27 RX ADMIN — Medication 300 MILLIGRAM(S): at 14:41

## 2022-04-27 RX ADMIN — Medication 50 MILLIGRAM(S): at 05:30

## 2022-04-27 RX ADMIN — Medication 81 MILLIGRAM(S): at 12:51

## 2022-04-27 RX ADMIN — LEVETIRACETAM 1000 MILLIGRAM(S): 250 TABLET, FILM COATED ORAL at 05:30

## 2022-04-27 RX ADMIN — ATORVASTATIN CALCIUM 40 MILLIGRAM(S): 80 TABLET, FILM COATED ORAL at 21:21

## 2022-04-27 RX ADMIN — Medication 300 MILLIGRAM(S): at 05:30
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
## 2022-04-27 NOTE — DIETITIAN INITIAL EVALUATION ADULT - ORAL INTAKE PTA/DIET HISTORY
Pt reports good PO intake PTA. Usually eats 3 meals/day but was trying to only eat 2 meals/day lately due to concerns about weight gain. Pt's wife cooks for him. Pt was not monitoring anything in his diet PTA, reports that his wife controls his diet. Pt reports liking home-cooked [place] food. Prefers rice, fish, pork, lamb, zepeda goat per pt. Noted with weight fluctuations, pt weighed 220 lbs @ previous hospital but reported UBW of 160-170 lbs. Current weight is 190 lbs.
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
## 2022-04-27 NOTE — DIETITIAN INITIAL EVALUATION ADULT - PERTINENT LABORATORY DATA
04-27    140  |  105  |  25<H>  ----------------------------<  99  3.9   |  28  |  1.26    Ca    8.7      27 Apr 2022 06:18    TPro  6.5  /  Alb  2.7<L>  /  TBili  0.4  /  DBili  x   /  AST  30  /  ALT  52<H>  /  AlkPhos  52  04-27  A1C with Estimated Average Glucose Result: 6.2 % (04-17-22 @ 08:09)

## 2022-04-27 NOTE — DIETITIAN INITIAL EVALUATION ADULT - ADD RECOMMEND
1. Pt provided with written and verbal nutrition education on heart healthy consistent carb diet (HbA1c of 6.2 noted).   2. Encourage PO intake, food preferences obtained

## 2022-04-27 NOTE — DIETITIAN INITIAL EVALUATION ADULT - PERTINENT MEDS FT
MEDICATIONS  (STANDING):  amLODIPine   Tablet 5 milliGRAM(s) Oral daily  aspirin  chewable 81 milliGRAM(s) Oral daily  atorvastatin 40 milliGRAM(s) Oral at bedtime  enoxaparin Injectable 40 milliGRAM(s) SubCutaneous every 24 hours  folic acid 1 milliGRAM(s) Oral daily  hydrALAZINE 50 milliGRAM(s) Oral every 8 hours  labetalol 300 milliGRAM(s) Oral three times a day  lacosamide 200 milliGRAM(s) Oral two times a day  levETIRAcetam 1000 milliGRAM(s) Oral two times a day  polyethylene glycol 3350 17 Gram(s) Oral every 12 hours  senna 2 Tablet(s) Oral at bedtime  thiamine 100 milliGRAM(s) Oral daily    MEDICATIONS  (PRN):  acetaminophen     Tablet .. 650 milliGRAM(s) Oral every 6 hours PRN Mild Pain (1 - 3)  acetylcysteine 10%  Inhalation 4 milliLiter(s) Inhalation every 6 hours PRN congestion  albuterol/ipratropium for Nebulization 3 milliLiter(s) Nebulizer every 6 hours PRN Shortness of Breath and/or Wheezing  bisacodyl 5 milliGRAM(s) Oral every 12 hours PRN Constipation
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
## 2022-04-28 LAB
FERRITIN SERPL-MCNC: 68 NG/ML — SIGNIFICANT CHANGE UP (ref 30–400)
FOLATE SERPL-MCNC: 11.1 NG/ML — SIGNIFICANT CHANGE UP
IRON SATN MFR SERPL: 17 % — SIGNIFICANT CHANGE UP (ref 16–55)
IRON SATN MFR SERPL: 43 UG/DL — LOW (ref 45–165)
TIBC SERPL-MCNC: 255 UG/DL — SIGNIFICANT CHANGE UP (ref 220–430)
UIBC SERPL-MCNC: 212 UG/DL — SIGNIFICANT CHANGE UP (ref 110–370)
VIT B12 SERPL-MCNC: 330 PG/ML — SIGNIFICANT CHANGE UP (ref 232–1245)

## 2022-04-28 PROCEDURE — 99233 SBSQ HOSP IP/OBS HIGH 50: CPT

## 2022-04-28 PROCEDURE — 99232 SBSQ HOSP IP/OBS MODERATE 35: CPT

## 2022-04-28 RX ORDER — LANOLIN ALCOHOL/MO/W.PET/CERES
6 CREAM (GRAM) TOPICAL AT BEDTIME
Refills: 0 | Status: DISCONTINUED | OUTPATIENT
Start: 2022-04-28 | End: 2022-05-10

## 2022-04-28 RX ADMIN — Medication 50 MILLIGRAM(S): at 21:19

## 2022-04-28 RX ADMIN — LEVETIRACETAM 1000 MILLIGRAM(S): 250 TABLET, FILM COATED ORAL at 18:10

## 2022-04-28 RX ADMIN — Medication 100 MILLIGRAM(S): at 11:48

## 2022-04-28 RX ADMIN — AMLODIPINE BESYLATE 5 MILLIGRAM(S): 2.5 TABLET ORAL at 05:23

## 2022-04-28 RX ADMIN — Medication 300 MILLIGRAM(S): at 05:23

## 2022-04-28 RX ADMIN — Medication 50 MILLIGRAM(S): at 15:07

## 2022-04-28 RX ADMIN — Medication 81 MILLIGRAM(S): at 11:48

## 2022-04-28 RX ADMIN — Medication 1 MILLIGRAM(S): at 11:48

## 2022-04-28 RX ADMIN — LEVETIRACETAM 1000 MILLIGRAM(S): 250 TABLET, FILM COATED ORAL at 05:23

## 2022-04-28 RX ADMIN — Medication 6 MILLIGRAM(S): at 21:19

## 2022-04-28 RX ADMIN — Medication 300 MILLIGRAM(S): at 15:07

## 2022-04-28 RX ADMIN — ATORVASTATIN CALCIUM 40 MILLIGRAM(S): 80 TABLET, FILM COATED ORAL at 21:19

## 2022-04-28 RX ADMIN — LACOSAMIDE 200 MILLIGRAM(S): 50 TABLET ORAL at 18:10

## 2022-04-28 RX ADMIN — LACOSAMIDE 200 MILLIGRAM(S): 50 TABLET ORAL at 05:23

## 2022-04-28 RX ADMIN — ENOXAPARIN SODIUM 40 MILLIGRAM(S): 100 INJECTION SUBCUTANEOUS at 11:49

## 2022-04-28 RX ADMIN — Medication 50 MILLIGRAM(S): at 05:23

## 2022-04-28 RX ADMIN — SENNA PLUS 2 TABLET(S): 8.6 TABLET ORAL at 21:19

## 2022-04-28 RX ADMIN — Medication 300 MILLIGRAM(S): at 21:19

## 2022-04-28 NOTE — PROGRESS NOTE ADULT - ATTENDING COMMENTS
Pt. seen with resident.  Agree with documentation above as per resident with amendments made as appropriate. Patient medically stable. Making progress towards rehab goals.     right parietal ICH  Stable
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
## 2022-04-28 NOTE — PROGRESS NOTE ADULT - SUBJECTIVE AND OBJECTIVE BOX
Patient is a 61y old  Male who presents with a chief complaint of Cerebral infarction (27 Apr 2022 15:00)    Patient seen and examined at bedside.  No overnight events  No complaints this morning    ROS:  CONSTITUTIONAL: No fever, weight loss, or fatigue  CARDIOVASCULAR: No chest pain, palpitations, dizziness, or leg swelling    ALLERGIES:  No Known Allergies    MEDICATIONS  (STANDING):  amLODIPine   Tablet 5 milliGRAM(s) Oral daily  aspirin  chewable 81 milliGRAM(s) Oral daily  atorvastatin 40 milliGRAM(s) Oral at bedtime  enoxaparin Injectable 40 milliGRAM(s) SubCutaneous every 24 hours  folic acid 1 milliGRAM(s) Oral daily  hydrALAZINE 50 milliGRAM(s) Oral every 8 hours  labetalol 300 milliGRAM(s) Oral three times a day  lacosamide 200 milliGRAM(s) Oral two times a day  levETIRAcetam 1000 milliGRAM(s) Oral two times a day  polyethylene glycol 3350 17 Gram(s) Oral every 12 hours  senna 2 Tablet(s) Oral at bedtime  thiamine 100 milliGRAM(s) Oral daily    MEDICATIONS  (PRN):  acetaminophen     Tablet .. 650 milliGRAM(s) Oral every 6 hours PRN Mild Pain (1 - 3)  acetylcysteine 10%  Inhalation 4 milliLiter(s) Inhalation every 6 hours PRN congestion  albuterol/ipratropium for Nebulization 3 milliLiter(s) Nebulizer every 6 hours PRN Shortness of Breath and/or Wheezing  bisacodyl 5 milliGRAM(s) Oral every 12 hours PRN Constipation    Vital Signs Last 24 Hrs  T(F): 97.6 (28 Apr 2022 07:51), Max: 98.4 (27 Apr 2022 21:16)  HR: 82 (28 Apr 2022 07:51) (73 - 84)  BP: 150/87 (28 Apr 2022 07:51) (125/82 - 159/95)  RR: 16 (28 Apr 2022 07:51) (16 - 16)  SpO2: 95% (28 Apr 2022 07:51) (95% - 97%)  I&O's Summary    27 Apr 2022 07:01  -  28 Apr 2022 07:00  --------------------------------------------------------  IN: 0 mL / OUT: 501 mL / NET: -501 mL      BMI (kg/m2): 30.9 (04-26-22 @ 16:09)    PHYSICAL EXAM:  GENERAL: NAD  HENT:  Atraumatic, Normocephalic; No tonsillar erythema, exudates, or enlargement; Moist mucous membranes;   EYES: EOMI, PERRLA, conjunctiva and sclera clear, no lid-lag  NECK: Supple, No JVD, Normal thyroid  CHEST/LUNG: Clear to percussion bilaterally; No rales, rhonchi, wheezing, or rubs; normal respiratory effort, no intercostal retractions  HEART: Regular rate and rhythm; No murmurs, rubs, or gallops; No pitting edema  ABDOMEN: Soft, Nontender, Nondistended; Bowel sounds present; No HSM  MUSCULOSKELETAL/EXTREMITIES:  2+ Peripheral Pulses, No clubbing or digital cyanosis  PSYCH: Appropriate affect, Alert & Awake; Good judgement    LABS:                        10.7   7.44  )-----------( 228      ( 27 Apr 2022 06:18 )             32.7       04-27    140  |  105  |  25  ----------------------------<  99  3.9   |  28  |  1.26    Ca    8.7      27 Apr 2022 06:18    TPro  6.5  /  Alb  2.7  /  TBili  0.4  /  DBili  x   /  AST  30  /  ALT  52  /  AlkPhos  52  04-27 04-17 Chol 166 mg/dL LDL -- HDL 42 mg/dL Trig 58 mg/dL      COVID-19 PCR: NotDetec (04-25-22 @ 10:11)  COVID-19 PCR: NotDetec (04-19-22 @ 23:47)      Care Discussed with Rehab Attending and Other Providers
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
## 2022-04-29 LAB — PM/SCL-100 AB SER LINE BLOT-ACNC: <20 UNITS — SIGNIFICANT CHANGE UP

## 2022-04-29 PROCEDURE — 99232 SBSQ HOSP IP/OBS MODERATE 35: CPT

## 2022-04-29 PROCEDURE — 93970 EXTREMITY STUDY: CPT | Mod: 26

## 2022-04-29 RX ORDER — PREGABALIN 225 MG/1
1000 CAPSULE ORAL DAILY
Refills: 0 | Status: DISCONTINUED | OUTPATIENT
Start: 2022-04-29 | End: 2022-05-10

## 2022-04-29 RX ADMIN — Medication 300 MILLIGRAM(S): at 21:22

## 2022-04-29 RX ADMIN — Medication 1 MILLIGRAM(S): at 12:12

## 2022-04-29 RX ADMIN — Medication 50 MILLIGRAM(S): at 21:22

## 2022-04-29 RX ADMIN — ENOXAPARIN SODIUM 40 MILLIGRAM(S): 100 INJECTION SUBCUTANEOUS at 12:12

## 2022-04-29 RX ADMIN — Medication 6 MILLIGRAM(S): at 21:22

## 2022-04-29 RX ADMIN — AMLODIPINE BESYLATE 5 MILLIGRAM(S): 2.5 TABLET ORAL at 06:10

## 2022-04-29 RX ADMIN — Medication 100 MILLIGRAM(S): at 12:12

## 2022-04-29 RX ADMIN — LEVETIRACETAM 1000 MILLIGRAM(S): 250 TABLET, FILM COATED ORAL at 06:10

## 2022-04-29 RX ADMIN — LACOSAMIDE 200 MILLIGRAM(S): 50 TABLET ORAL at 06:10

## 2022-04-29 RX ADMIN — ATORVASTATIN CALCIUM 40 MILLIGRAM(S): 80 TABLET, FILM COATED ORAL at 21:22

## 2022-04-29 RX ADMIN — PREGABALIN 1000 MICROGRAM(S): 225 CAPSULE ORAL at 12:12

## 2022-04-29 RX ADMIN — LEVETIRACETAM 1000 MILLIGRAM(S): 250 TABLET, FILM COATED ORAL at 17:27

## 2022-04-29 RX ADMIN — LACOSAMIDE 200 MILLIGRAM(S): 50 TABLET ORAL at 17:27

## 2022-04-29 RX ADMIN — SENNA PLUS 2 TABLET(S): 8.6 TABLET ORAL at 21:22

## 2022-04-29 RX ADMIN — Medication 50 MILLIGRAM(S): at 15:37

## 2022-04-29 RX ADMIN — Medication 300 MILLIGRAM(S): at 15:37

## 2022-04-29 RX ADMIN — Medication 300 MILLIGRAM(S): at 06:10

## 2022-04-29 RX ADMIN — Medication 50 MILLIGRAM(S): at 06:10

## 2022-04-29 RX ADMIN — Medication 81 MILLIGRAM(S): at 12:12

## 2022-04-29 NOTE — PROGRESS NOTE ADULT - ATTENDING COMMENTS
Seen and examined. Findings as stated by Rehab resident      60yo with traumatic Rt parietal inracranial hemorrhage with L hemiparesis  No acute med complaint on exam  Engaging in SLP therapy, working on concentration    Observed during PT--Ambulating 75 ft with RW with CGA    Clinically stable fully oriented and alert  Marked improvement of motor function  Still has deficits in executive planning/concentration  Continue PT/OT/SLP   Est dc as stated at last team conference
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
## 2022-04-29 NOTE — PROGRESS NOTE ADULT - SUBJECTIVE AND OBJECTIVE BOX
61y old  Male who presents with a chief complaint of Cerebral infarction (27 Apr 2022 15:00)    Patient seen and examined at bedside.  No overnight events  No complaints this morning    ROS:  CONSTITUTIONAL: No fever, weight loss, or fatigue  CARDIOVASCULAR: No chest pain, palpitations, dizziness, or leg swelling    ALLERGIES:  No Known Allergies    MEDICATIONS  (STANDING):  amLODIPine   Tablet 5 milliGRAM(s) Oral daily  aspirin  chewable 81 milliGRAM(s) Oral daily  atorvastatin 40 milliGRAM(s) Oral at bedtime  cyanocobalamin 1000 MICROGram(s) Oral daily  enoxaparin Injectable 40 milliGRAM(s) SubCutaneous every 24 hours  folic acid 1 milliGRAM(s) Oral daily  hydrALAZINE 50 milliGRAM(s) Oral every 8 hours  labetalol 300 milliGRAM(s) Oral three times a day  lacosamide 200 milliGRAM(s) Oral two times a day  levETIRAcetam 1000 milliGRAM(s) Oral two times a day  melatonin 6 milliGRAM(s) Oral at bedtime  polyethylene glycol 3350 17 Gram(s) Oral every 12 hours  senna 2 Tablet(s) Oral at bedtime  thiamine 100 milliGRAM(s) Oral daily    MEDICATIONS  (PRN):  acetaminophen     Tablet .. 650 milliGRAM(s) Oral every 6 hours PRN Mild Pain (1 - 3)  acetylcysteine 10%  Inhalation 4 milliLiter(s) Inhalation every 6 hours PRN congestion  albuterol/ipratropium for Nebulization 3 milliLiter(s) Nebulizer every 6 hours PRN Shortness of Breath and/or Wheezing  bisacodyl 5 milliGRAM(s) Oral every 12 hours PRN Constipation    Vital Signs Last 24 Hrs  T(F): 98.7 (29 Apr 2022 07:43), Max: 98.7 (29 Apr 2022 07:43)  HR: 81 (29 Apr 2022 07:43) (74 - 85)  BP: 125/82 (29 Apr 2022 07:43) (125/82 - 152/77)  RR: 16 (29 Apr 2022 07:43) (16 - 16)  SpO2: 95% (29 Apr 2022 07:43) (95% - 96%)  I&O's Summary    28 Apr 2022 07:01  -  29 Apr 2022 07:00  --------------------------------------------------------  IN: 0 mL / OUT: 400 mL / NET: -400 mL    29 Apr 2022 07:01  -  29 Apr 2022 11:42  --------------------------------------------------------  IN: 0 mL / OUT: 300 mL / NET: -300 mL      BMI (kg/m2): 30.9 (04-26-22 @ 16:09)    PHYSICAL EXAM:  GENERAL: NAD  HENT:  Atraumatic, Normocephalic; No tonsillar erythema, exudates, or enlargement; Moist mucous membranes;   EYES: EOMI, PERRLA, conjunctiva and sclera clear, no lid-lag  NECK: Supple, No JVD, Normal thyroid  CHEST/LUNG: Clear to percussion bilaterally; No rales, rhonchi, wheezing, or rubs; normal respiratory effort, no intercostal retractions  HEART: Regular rate and rhythm; No murmurs, rubs, or gallops; No pitting edema  ABDOMEN: Soft, Nontender, Nondistended; Bowel sounds present; No HSM  MUSCULOSKELETAL/EXTREMITIES:  2+ Peripheral Pulses, No clubbing or digital cyanosis  PSYCH: Appropriate affect, Alert & Awake; Good judgement    LABS:                        10.7   7.44  )-----------( 228      ( 27 Apr 2022 06:18 )             32.7       04-27    140  |  105  |  25  ----------------------------<  99  3.9   |  28  |  1.26    Ca    8.7      27 Apr 2022 06:18    TPro  6.5  /  Alb  2.7  /  TBili  0.4  /  DBili  x   /  AST  30  /  ALT  52  /  AlkPhos  52  04-27     04-17 Chol 166 mg/dL LDL -- HDL 42 mg/dL Trig 58 mg/dL      COVID-19 PCR: Angelitatec (04-25-22 @ 10:11)  COVID-19 PCR: NotDetec (04-19-22 @ 23:47)      Care Discussed with Rehab Attending and Other Providers
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
## 2022-04-30 ENCOUNTER — TRANSCRIPTION ENCOUNTER (OUTPATIENT)
Age: 62
End: 2022-04-30

## 2022-04-30 PROCEDURE — 99232 SBSQ HOSP IP/OBS MODERATE 35: CPT

## 2022-04-30 RX ADMIN — Medication 1 MILLIGRAM(S): at 12:28

## 2022-04-30 RX ADMIN — ENOXAPARIN SODIUM 40 MILLIGRAM(S): 100 INJECTION SUBCUTANEOUS at 12:28

## 2022-04-30 RX ADMIN — AMLODIPINE BESYLATE 5 MILLIGRAM(S): 2.5 TABLET ORAL at 06:18

## 2022-04-30 RX ADMIN — PREGABALIN 1000 MICROGRAM(S): 225 CAPSULE ORAL at 12:28

## 2022-04-30 RX ADMIN — Medication 81 MILLIGRAM(S): at 12:28

## 2022-04-30 RX ADMIN — Medication 100 MILLIGRAM(S): at 12:28

## 2022-04-30 RX ADMIN — LACOSAMIDE 200 MILLIGRAM(S): 50 TABLET ORAL at 17:08

## 2022-04-30 RX ADMIN — Medication 50 MILLIGRAM(S): at 15:24

## 2022-04-30 RX ADMIN — Medication 6 MILLIGRAM(S): at 21:10

## 2022-04-30 RX ADMIN — LEVETIRACETAM 1000 MILLIGRAM(S): 250 TABLET, FILM COATED ORAL at 17:08

## 2022-04-30 RX ADMIN — ATORVASTATIN CALCIUM 40 MILLIGRAM(S): 80 TABLET, FILM COATED ORAL at 21:10

## 2022-04-30 RX ADMIN — LACOSAMIDE 200 MILLIGRAM(S): 50 TABLET ORAL at 06:18

## 2022-04-30 RX ADMIN — Medication 50 MILLIGRAM(S): at 21:10

## 2022-04-30 RX ADMIN — LEVETIRACETAM 1000 MILLIGRAM(S): 250 TABLET, FILM COATED ORAL at 06:18

## 2022-04-30 RX ADMIN — Medication 50 MILLIGRAM(S): at 06:18

## 2022-04-30 RX ADMIN — Medication 300 MILLIGRAM(S): at 21:10

## 2022-04-30 RX ADMIN — Medication 300 MILLIGRAM(S): at 06:18

## 2022-04-30 RX ADMIN — Medication 300 MILLIGRAM(S): at 15:24

## 2022-04-30 RX ADMIN — SENNA PLUS 2 TABLET(S): 8.6 TABLET ORAL at 21:10
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
## 2022-04-30 NOTE — DISCHARGE NOTE PROVIDER - CARE PROVIDERS DIRECT ADDRESSES
,DirectAddress_Unknown,DirectAddress_Unknown,[email or web address] ,DirectAddress_Unknown,DirectAddress_Unknown,[email or web address],DirectAddress_Unknown

## 2022-04-30 NOTE — PROGRESS NOTE ADULT - SUBJECTIVE AND OBJECTIVE BOX
Patient is a 61y old  Male who presents with a chief complaint of Functional deficits s/p CVA (29 Apr 2022 13:10)      Patient seen and examined at bedside.    ALLERGIES:  No Known Allergies    MEDICATIONS  (STANDING):  amLODIPine   Tablet 5 milliGRAM(s) Oral daily  aspirin  chewable 81 milliGRAM(s) Oral daily  atorvastatin 40 milliGRAM(s) Oral at bedtime  cyanocobalamin 1000 MICROGram(s) Oral daily  enoxaparin Injectable 40 milliGRAM(s) SubCutaneous every 24 hours  folic acid 1 milliGRAM(s) Oral daily  hydrALAZINE 50 milliGRAM(s) Oral every 8 hours  labetalol 300 milliGRAM(s) Oral three times a day  lacosamide 200 milliGRAM(s) Oral two times a day  levETIRAcetam 1000 milliGRAM(s) Oral two times a day  melatonin 6 milliGRAM(s) Oral at bedtime  polyethylene glycol 3350 17 Gram(s) Oral every 12 hours  senna 2 Tablet(s) Oral at bedtime  thiamine 100 milliGRAM(s) Oral daily    MEDICATIONS  (PRN):  acetaminophen     Tablet .. 650 milliGRAM(s) Oral every 6 hours PRN Mild Pain (1 - 3)  acetylcysteine 10%  Inhalation 4 milliLiter(s) Inhalation every 6 hours PRN congestion  albuterol/ipratropium for Nebulization 3 milliLiter(s) Nebulizer every 6 hours PRN Shortness of Breath and/or Wheezing  bisacodyl 5 milliGRAM(s) Oral every 12 hours PRN Constipation    Vital Signs Last 24 Hrs  T(F): 98.1 (30 Apr 2022 08:22), Max: 98.1 (30 Apr 2022 08:22)  HR: 79 (30 Apr 2022 08:22) (79 - 83)  BP: 116/69 (30 Apr 2022 08:22) (116/69 - 139/76)  RR: 16 (30 Apr 2022 08:22) (16 - 16)  SpO2: 97% (30 Apr 2022 08:22) (94% - 97%)  I&O's Summary    29 Apr 2022 07:01  -  30 Apr 2022 07:00  --------------------------------------------------------  IN: 0 mL / OUT: 300 mL / NET: -300 mL    PHYSICAL EXAM:  GENERAL: NAD  HENT:  Atraumatic, Normocephalic; No tonsillar erythema, exudates, or enlargement; Moist mucous membranes;   EYES: EOMI, PERRLA, conjunctiva and sclera clear, no lid-lag  NECK: Supple, No JVD, Normal thyroid  CHEST/LUNG: Clear to percussion bilaterally; No rales, rhonchi, wheezing, or rubs; normal respiratory effort, no intercostal retractions  HEART: Regular rate and rhythm; No murmurs, rubs, or gallops; No pitting edema  ABDOMEN: Soft, Nontender, Nondistended; Bowel sounds present; No HSM  MUSCULOSKELETAL/EXTREMITIES:  2+ Peripheral Pulses, No clubbing or digital cyanosis  PSYCH: Appropriate affect, Alert & Awake; Good judgement      LABS:        04-17 Chol 166 mg/dL LDL -- HDL 42 mg/dL Trig 58 mg/dL    COVID-19 PCR: NotDetec (04-25-22 @ 10:11)  COVID-19 PCR: NotDetec (04-19-22 @ 23:47)  COVID-19 PCR: NotDetec (04-25-22 @ 10:11)  COVID-19 PCR: NotDetec (04-19-22 @ 23:47)

## 2022-04-30 NOTE — DISCHARGE NOTE PROVIDER - NSDCMRMEDTOKEN_GEN_ALL_CORE_FT
amLODIPine 5 mg oral tablet: 1 tab(s) orally once a day  aspirin 81 mg oral tablet, chewable: 1 tab(s) orally once a day  atorvastatin 40 mg oral tablet: 1 tab(s) orally once a day (at bedtime)  hydrALAZINE 50 mg oral tablet: 1 tab(s) orally every 8 hours  ipratropium-albuterol 0.5 mg-2.5 mg/3 mL inhalation solution: 3 milliliter(s) inhaled every 6 hours  labetalol 300 mg oral tablet: 1 tab(s) orally 3 times a day  lacosamide 200 mg oral tablet: 1 tab(s) orally 2 times a day  levETIRAcetam 1000 mg oral tablet: 1 tab(s) orally 2 times a day   amLODIPine 5 mg oral tablet: 1 tab(s) orally once a day  aspirin 81 mg oral tablet, chewable: 1 tab(s) orally once a day  atorvastatin 40 mg oral tablet: 1 tab(s) orally once a day (at bedtime)  hydrALAZINE 50 mg oral tablet: 1 tab(s) orally every 8 hours  ipratropium-albuterol 0.5 mg-2.5 mg/3 mL inhalation solution: 3 milliliter(s) inhaled every 6 hours  labetalol 300 mg oral tablet: 1 tab(s) orally 3 times a day  lacosamide 200 mg oral tablet: 1 tab(s) orally 2 times a day  lacosamide 200 mg oral tablet: 1 tab(s) orally 2 times a day MDD:400mg  levETIRAcetam 1000 mg oral tablet: 1 tab(s) orally 2 times a day   aspirin 81 mg oral tablet, chewable: 1 tab(s) orally once a day  atorvastatin 40 mg oral tablet: 1 tab(s) orally once a day (at bedtime)  cyanocobalamin 1000 mcg oral tablet: 1 tab(s) orally once a day  folic acid 1 mg oral tablet: 1 tab(s) orally once a day  hydrALAZINE 50 mg oral tablet: 1 tab(s) orally every 8 hours  labetalol 300 mg oral tablet: 1 tab(s) orally 3 times a day  lacosamide 200 mg oral tablet: 1 tab(s) orally 2 times a day MDD:400mg  levETIRAcetam 1000 mg oral tablet: 1 tab(s) orally 2 times a day  losartan 50 mg oral tablet: 1 tab(s) orally once a day  melatonin 3 mg oral tablet: 2 tab(s) orally once a day (at bedtime)  polyethylene glycol 3350 oral powder for reconstitution: 17 gram(s) orally once a day, As Needed for constipation  thiamine 100 mg oral tablet: 1 tab(s) orally once a day

## 2022-04-30 NOTE — DISCHARGE NOTE PROVIDER - NPI NUMBER (FOR SYSADMIN USE ONLY) :
[[id number]] [[id number]]

## 2022-04-30 NOTE — DISCHARGE NOTE PROVIDER - CARE PROVIDER_API CALL
Rob Caldera)  Neurology; Vascular Neurology  [address], [address]  [place] [address]  Phone: [phone]  Fax: [phone]  Follow Up Time:     Rolf Martin (DO)  Cardiology; Internal Medicine  [address], [address]  [place] [address]  Phone: [phone]  Fax: [phone]  Follow Up Time:     Matthew Ashton)  Neurosurgery  [address], [address]  [place] [address]  Phone: [phone]  Fax: [phone]  Follow Up Time:     Debbie Rivas (DO)  Brain Injury Medicine; PhysicalRehab Medicine  [address] [place] [address]  Phone: [phone]  Fax: [phone]  Follow Up Time: 1 month   Rob Caldera)  Neurology; Vascular Neurology  [address], [address]  [place] [address]  Phone: [phone]  Fax: [phone]  Follow Up Time:     Rolf Martin ()  Cardiology; Internal Medicine  [address], [address]  [place] [address]  Phone: [phone]  Fax: [phone]  Follow Up Time:     Matthew Ashton)  Neurosurgery  [address], [address]  [place] [address]  Phone: [phone]  Fax: [phone]  Follow Up Time:     Damaris Nuñez)  PhysicalRehab Medicine  [address]th [address]  [place], [place] [id number]  Phone: [phone]  Fax: [phone]  Follow Up Time: 1 month
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
## 2022-04-30 NOTE — DISCHARGE NOTE PROVIDER - NSDCCPCAREPLAN_GEN_ALL_CORE_FT
PRINCIPAL DISCHARGE DIAGNOSIS  Diagnosis: Cerebrovascular accident (CVA)  Assessment and Plan of Treatment: You came to the hospital for acute rehab following a stroke, and received PT, OT and speech therapy. Please continue taking your medications as prescribed and monitor your blood pressure. Reduce fat, cholesterol and salt in your diet. Increase intake of fruits and vegetables. If you experience any symptoms of facial drooping, slurred speech, arm or leg weakness, severe headache, vision changes or any worsening symptoms, notify provider immediately and return to ER. Please follow up with your PCP, neurologist and PM&R regarding your recent rehab admission.      SECONDARY DISCHARGE DIAGNOSES  Diagnosis: HTN (hypertension)  Assessment and Plan of Treatment: You have a history of high blood pressure. You were noted to have elevated BPs during your admission, and high BPs likely contributed to your stroke. Please continue to take your medications as prescribed and follow-up with your PCP for routine blood pressure monitoring and adjustment to your medication regimen.

## 2022-04-30 NOTE — DISCHARGE NOTE PROVIDER - PROVIDER TOKENS
PROVIDER:[TOKEN:[[id number]]],PROVIDER:[TOKEN:[[id number]]],PROVIDER:[TOKEN:[[id number]]],PROVIDER:[TOKEN:[[id number]],FOLLOWUP:[1 month]] PROVIDER:[TOKEN:[[id number]]],PROVIDER:[TOKEN:[[id number]]],PROVIDER:[TOKEN:[[id number]]],PROVIDER:[TOKEN:[[id number]],FOLLOWUP:[1 month]]

## 2022-05-01 PROCEDURE — 99232 SBSQ HOSP IP/OBS MODERATE 35: CPT

## 2022-05-01 RX ADMIN — Medication 50 MILLIGRAM(S): at 05:33

## 2022-05-01 RX ADMIN — LACOSAMIDE 200 MILLIGRAM(S): 50 TABLET ORAL at 18:07

## 2022-05-01 RX ADMIN — LEVETIRACETAM 1000 MILLIGRAM(S): 250 TABLET, FILM COATED ORAL at 05:33

## 2022-05-01 RX ADMIN — LACOSAMIDE 200 MILLIGRAM(S): 50 TABLET ORAL at 05:32

## 2022-05-01 RX ADMIN — Medication 50 MILLIGRAM(S): at 21:14

## 2022-05-01 RX ADMIN — Medication 300 MILLIGRAM(S): at 21:14

## 2022-05-01 RX ADMIN — Medication 1 MILLIGRAM(S): at 12:25

## 2022-05-01 RX ADMIN — PREGABALIN 1000 MICROGRAM(S): 225 CAPSULE ORAL at 12:25

## 2022-05-01 RX ADMIN — Medication 300 MILLIGRAM(S): at 14:34

## 2022-05-01 RX ADMIN — ATORVASTATIN CALCIUM 40 MILLIGRAM(S): 80 TABLET, FILM COATED ORAL at 21:14

## 2022-05-01 RX ADMIN — LEVETIRACETAM 1000 MILLIGRAM(S): 250 TABLET, FILM COATED ORAL at 18:07

## 2022-05-01 RX ADMIN — ENOXAPARIN SODIUM 40 MILLIGRAM(S): 100 INJECTION SUBCUTANEOUS at 12:25

## 2022-05-01 RX ADMIN — Medication 6 MILLIGRAM(S): at 21:14

## 2022-05-01 RX ADMIN — AMLODIPINE BESYLATE 5 MILLIGRAM(S): 2.5 TABLET ORAL at 05:33

## 2022-05-01 RX ADMIN — Medication 300 MILLIGRAM(S): at 05:33

## 2022-05-01 RX ADMIN — Medication 100 MILLIGRAM(S): at 12:25

## 2022-05-01 RX ADMIN — Medication 81 MILLIGRAM(S): at 12:25

## 2022-05-01 NOTE — PROGRESS NOTE ADULT - SUBJECTIVE AND OBJECTIVE BOX
Patient is a 61y old  Male who presents with a chief complaint of Functional deficits s/p CVA (01 May 2022 07:39)      SUBJECTIVE / OVERNIGHT EVENTS:  Pt seen and examined at bedside. No acute events overnight.  Pt denies cp, palpitations, sob, abd pain, N/V, fever, chills.    ROS:  All other review of systems negative    Allergies    No Known Allergies    Intolerances        MEDICATIONS  (STANDING):  amLODIPine   Tablet 5 milliGRAM(s) Oral daily  aspirin  chewable 81 milliGRAM(s) Oral daily  atorvastatin 40 milliGRAM(s) Oral at bedtime  cyanocobalamin 1000 MICROGram(s) Oral daily  enoxaparin Injectable 40 milliGRAM(s) SubCutaneous every 24 hours  folic acid 1 milliGRAM(s) Oral daily  hydrALAZINE 50 milliGRAM(s) Oral every 8 hours  labetalol 300 milliGRAM(s) Oral three times a day  lacosamide 200 milliGRAM(s) Oral two times a day  levETIRAcetam 1000 milliGRAM(s) Oral two times a day  melatonin 6 milliGRAM(s) Oral at bedtime  polyethylene glycol 3350 17 Gram(s) Oral every 12 hours  senna 2 Tablet(s) Oral at bedtime  thiamine 100 milliGRAM(s) Oral daily    MEDICATIONS  (PRN):  acetaminophen     Tablet .. 650 milliGRAM(s) Oral every 6 hours PRN Mild Pain (1 - 3)  acetylcysteine 10%  Inhalation 4 milliLiter(s) Inhalation every 6 hours PRN congestion  albuterol/ipratropium for Nebulization 3 milliLiter(s) Nebulizer every 6 hours PRN Shortness of Breath and/or Wheezing  bisacodyl 5 milliGRAM(s) Oral every 12 hours PRN Constipation      Vital Signs Last 24 Hrs  T(C): 36.7 (01 May 2022 08:35), Max: 36.7 (01 May 2022 08:35)  T(F): 98 (01 May 2022 08:35), Max: 98 (01 May 2022 08:35)  HR: 84 (01 May 2022 08:35) (77 - 90)  BP: 140/87 (01 May 2022 08:35) (128/76 - 141/81)  BP(mean): --  RR: 16 (01 May 2022 08:35) (16 - 16)  SpO2: 95% (01 May 2022 08:35) (94% - 95%)  CAPILLARY BLOOD GLUCOSE        I&O's Summary    30 Apr 2022 07:01  -  01 May 2022 07:00  --------------------------------------------------------  IN: 0 mL / OUT: 300 mL / NET: -300 mL        PHYSICAL EXAM:  GENERAL: NAD, well-developed male  HEAD:  Atraumatic, Normocephalic  NECK: Supple, No JVD  CHEST/LUNG: Clear to auscultation bilaterally; No wheeze, nonlabored breathing  HEART: Regular rate and rhythm; No murmurs, rubs, or gallops  ABDOMEN: Soft, Nontender, Nondistended; Bowel sounds present  EXTREMITIES:  2+ Peripheral Pulses, No clubbing, cyanosis, or edema  PSYCH: calm, appropriate mood    LABS:                    RADIOLOGY & ADDITIONAL TESTS:  Results Reviewed:   Imaging Personally Reviewed:  Electrocardiogram Personally Reviewed:    COORDINATION OF CARE:  Care Discussed with Consultants/Other Providers [Y/N]:  Prior or Outpatient Records Reviewed [Y/N]:
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
## 2022-05-02 LAB — TSH SERPL-MCNC: 2.27 UIU/ML — SIGNIFICANT CHANGE UP (ref 0.36–3.74)

## 2022-05-02 PROCEDURE — 99232 SBSQ HOSP IP/OBS MODERATE 35: CPT

## 2022-05-02 RX ADMIN — Medication 1 MILLIGRAM(S): at 11:32

## 2022-05-02 RX ADMIN — Medication 50 MILLIGRAM(S): at 14:54

## 2022-05-02 RX ADMIN — LACOSAMIDE 200 MILLIGRAM(S): 50 TABLET ORAL at 05:41

## 2022-05-02 RX ADMIN — Medication 6 MILLIGRAM(S): at 21:06

## 2022-05-02 RX ADMIN — Medication 50 MILLIGRAM(S): at 05:41

## 2022-05-02 RX ADMIN — SENNA PLUS 2 TABLET(S): 8.6 TABLET ORAL at 21:06

## 2022-05-02 RX ADMIN — PREGABALIN 1000 MICROGRAM(S): 225 CAPSULE ORAL at 11:32

## 2022-05-02 RX ADMIN — ATORVASTATIN CALCIUM 40 MILLIGRAM(S): 80 TABLET, FILM COATED ORAL at 21:05

## 2022-05-02 RX ADMIN — LEVETIRACETAM 1000 MILLIGRAM(S): 250 TABLET, FILM COATED ORAL at 17:12

## 2022-05-02 RX ADMIN — Medication 50 MILLIGRAM(S): at 21:05

## 2022-05-02 RX ADMIN — Medication 100 MILLIGRAM(S): at 11:32

## 2022-05-02 RX ADMIN — LACOSAMIDE 200 MILLIGRAM(S): 50 TABLET ORAL at 17:14

## 2022-05-02 RX ADMIN — POLYETHYLENE GLYCOL 3350 17 GRAM(S): 17 POWDER, FOR SOLUTION ORAL at 17:12

## 2022-05-02 RX ADMIN — Medication 81 MILLIGRAM(S): at 11:32

## 2022-05-02 RX ADMIN — AMLODIPINE BESYLATE 5 MILLIGRAM(S): 2.5 TABLET ORAL at 05:41

## 2022-05-02 RX ADMIN — ENOXAPARIN SODIUM 40 MILLIGRAM(S): 100 INJECTION SUBCUTANEOUS at 11:37

## 2022-05-02 RX ADMIN — LEVETIRACETAM 1000 MILLIGRAM(S): 250 TABLET, FILM COATED ORAL at 05:42

## 2022-05-02 RX ADMIN — Medication 300 MILLIGRAM(S): at 14:54

## 2022-05-02 RX ADMIN — Medication 300 MILLIGRAM(S): at 21:05

## 2022-05-02 RX ADMIN — Medication 300 MILLIGRAM(S): at 05:42

## 2022-05-02 NOTE — PROGRESS NOTE ADULT - ATTENDING COMMENTS
Seen and examined. Findings as stated by Dr Alba Rehab resident    62 yo with traumatic Rt parietal intracranial hemorrhage with L hemiparesis    Engaging in SLP therapy, working on concentration  Making gradual but sustained improvement with therapy    Due repeat labs tomorrow  AAO X 3    Clinically stable fully oriented and alert  Continue PT/OT/SLP  Still has deficits in executive planning/concentration  Est dc as stated at last team conference Seen and examined. Findings as stated by NP Calvin    60 yo with traumatic Rt parietal intracranial hemorrhage with L hemiparesis    Engaging in SLP therapy, working on concentration  Making gradual but sustained improvement with therapy    Due repeat labs tomorrow  AAO X 3    Clinically stable fully oriented and alert  Continue PT/OT/SLP  Still has deficits in executive planning/concentration  Est dc as stated at last team conference
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
## 2022-05-03 LAB
ALBUMIN SERPL ELPH-MCNC: 2.8 G/DL — LOW (ref 3.3–5)
ALP SERPL-CCNC: 63 U/L — SIGNIFICANT CHANGE UP (ref 40–120)
ALT FLD-CCNC: 62 U/L — HIGH (ref 10–45)
ANION GAP SERPL CALC-SCNC: 9 MMOL/L — SIGNIFICANT CHANGE UP (ref 5–17)
AST SERPL-CCNC: 31 U/L — SIGNIFICANT CHANGE UP (ref 10–40)
BILIRUB SERPL-MCNC: 0.3 MG/DL — SIGNIFICANT CHANGE UP (ref 0.2–1.2)
BUN SERPL-MCNC: 21 MG/DL — SIGNIFICANT CHANGE UP (ref 7–23)
CALCIUM SERPL-MCNC: 8.9 MG/DL — SIGNIFICANT CHANGE UP (ref 8.4–10.5)
CHLORIDE SERPL-SCNC: 107 MMOL/L — SIGNIFICANT CHANGE UP (ref 96–108)
CO2 SERPL-SCNC: 27 MMOL/L — SIGNIFICANT CHANGE UP (ref 22–31)
CREAT SERPL-MCNC: 1.26 MG/DL — SIGNIFICANT CHANGE UP (ref 0.5–1.3)
EGFR: 64 ML/MIN/1.73M2 — SIGNIFICANT CHANGE UP
GLUCOSE SERPL-MCNC: 103 MG/DL — HIGH (ref 70–99)
HCT VFR BLD CALC: 31.9 % — LOW (ref 39–50)
HGB BLD-MCNC: 10.5 G/DL — LOW (ref 13–17)
MCHC RBC-ENTMCNC: 27.8 PG — SIGNIFICANT CHANGE UP (ref 27–34)
MCHC RBC-ENTMCNC: 32.9 GM/DL — SIGNIFICANT CHANGE UP (ref 32–36)
MCV RBC AUTO: 84.4 FL — SIGNIFICANT CHANGE UP (ref 80–100)
NRBC # BLD: 0 /100 WBCS — SIGNIFICANT CHANGE UP (ref 0–0)
PLATELET # BLD AUTO: 268 K/UL — SIGNIFICANT CHANGE UP (ref 150–400)
POTASSIUM SERPL-MCNC: 4 MMOL/L — SIGNIFICANT CHANGE UP (ref 3.5–5.3)
POTASSIUM SERPL-SCNC: 4 MMOL/L — SIGNIFICANT CHANGE UP (ref 3.5–5.3)
PROT SERPL-MCNC: 6.9 G/DL — SIGNIFICANT CHANGE UP (ref 6–8.3)
RBC # BLD: 3.78 M/UL — LOW (ref 4.2–5.8)
RBC # FLD: 14.6 % — HIGH (ref 10.3–14.5)
SODIUM SERPL-SCNC: 143 MMOL/L — SIGNIFICANT CHANGE UP (ref 135–145)
WBC # BLD: 7.43 K/UL — SIGNIFICANT CHANGE UP (ref 3.8–10.5)
WBC # FLD AUTO: 7.43 K/UL — SIGNIFICANT CHANGE UP (ref 3.8–10.5)

## 2022-05-03 PROCEDURE — 99233 SBSQ HOSP IP/OBS HIGH 50: CPT

## 2022-05-03 RX ORDER — LACOSAMIDE 50 MG/1
200 TABLET ORAL
Refills: 0 | Status: DISCONTINUED | OUTPATIENT
Start: 2022-05-03 | End: 2022-05-10

## 2022-05-03 RX ADMIN — Medication 50 MILLIGRAM(S): at 21:12

## 2022-05-03 RX ADMIN — Medication 1 MILLIGRAM(S): at 12:55

## 2022-05-03 RX ADMIN — LACOSAMIDE 200 MILLIGRAM(S): 50 TABLET ORAL at 18:50

## 2022-05-03 RX ADMIN — Medication 300 MILLIGRAM(S): at 05:57

## 2022-05-03 RX ADMIN — PREGABALIN 1000 MICROGRAM(S): 225 CAPSULE ORAL at 12:56

## 2022-05-03 RX ADMIN — ENOXAPARIN SODIUM 40 MILLIGRAM(S): 100 INJECTION SUBCUTANEOUS at 12:56

## 2022-05-03 RX ADMIN — LACOSAMIDE 200 MILLIGRAM(S): 50 TABLET ORAL at 05:57

## 2022-05-03 RX ADMIN — SENNA PLUS 2 TABLET(S): 8.6 TABLET ORAL at 21:12

## 2022-05-03 RX ADMIN — AMLODIPINE BESYLATE 5 MILLIGRAM(S): 2.5 TABLET ORAL at 05:57

## 2022-05-03 RX ADMIN — ATORVASTATIN CALCIUM 40 MILLIGRAM(S): 80 TABLET, FILM COATED ORAL at 21:13

## 2022-05-03 RX ADMIN — Medication 50 MILLIGRAM(S): at 05:57

## 2022-05-03 RX ADMIN — Medication 81 MILLIGRAM(S): at 12:56

## 2022-05-03 RX ADMIN — LEVETIRACETAM 1000 MILLIGRAM(S): 250 TABLET, FILM COATED ORAL at 18:50

## 2022-05-03 RX ADMIN — LEVETIRACETAM 1000 MILLIGRAM(S): 250 TABLET, FILM COATED ORAL at 05:57

## 2022-05-03 RX ADMIN — Medication 50 MILLIGRAM(S): at 14:09

## 2022-05-03 RX ADMIN — Medication 100 MILLIGRAM(S): at 12:57

## 2022-05-03 RX ADMIN — Medication 6 MILLIGRAM(S): at 21:13

## 2022-05-03 RX ADMIN — Medication 300 MILLIGRAM(S): at 14:09

## 2022-05-03 RX ADMIN — Medication 300 MILLIGRAM(S): at 21:13
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
## 2022-05-03 NOTE — PROGRESS NOTE ADULT - ATTENDING COMMENTS
Pt. seen with resident.  Agree with documentation above as per resident with amendments made as appropriate. Patient medically stable. Making progress towards rehab goals.     IPH    LE edema  -- NORA stockings ordered  -- Will d/w Hospitalist tomorrow regarding potential cause and treatment-- ?amlodipine SE?  Echo may need to be repeated.

## 2022-05-04 PROCEDURE — 99232 SBSQ HOSP IP/OBS MODERATE 35: CPT

## 2022-05-04 PROCEDURE — 99233 SBSQ HOSP IP/OBS HIGH 50: CPT

## 2022-05-04 RX ORDER — LOSARTAN POTASSIUM 100 MG/1
50 TABLET, FILM COATED ORAL DAILY
Refills: 0 | Status: DISCONTINUED | OUTPATIENT
Start: 2022-05-05 | End: 2022-05-05

## 2022-05-04 RX ADMIN — AMLODIPINE BESYLATE 5 MILLIGRAM(S): 2.5 TABLET ORAL at 06:14

## 2022-05-04 RX ADMIN — POLYETHYLENE GLYCOL 3350 17 GRAM(S): 17 POWDER, FOR SOLUTION ORAL at 06:14

## 2022-05-04 RX ADMIN — SENNA PLUS 2 TABLET(S): 8.6 TABLET ORAL at 21:10

## 2022-05-04 RX ADMIN — Medication 50 MILLIGRAM(S): at 14:45

## 2022-05-04 RX ADMIN — LACOSAMIDE 200 MILLIGRAM(S): 50 TABLET ORAL at 18:49

## 2022-05-04 RX ADMIN — Medication 50 MILLIGRAM(S): at 06:14

## 2022-05-04 RX ADMIN — Medication 300 MILLIGRAM(S): at 21:10

## 2022-05-04 RX ADMIN — LEVETIRACETAM 1000 MILLIGRAM(S): 250 TABLET, FILM COATED ORAL at 18:49

## 2022-05-04 RX ADMIN — LACOSAMIDE 200 MILLIGRAM(S): 50 TABLET ORAL at 06:14

## 2022-05-04 RX ADMIN — ATORVASTATIN CALCIUM 40 MILLIGRAM(S): 80 TABLET, FILM COATED ORAL at 21:10

## 2022-05-04 RX ADMIN — Medication 1 MILLIGRAM(S): at 12:59

## 2022-05-04 RX ADMIN — ENOXAPARIN SODIUM 40 MILLIGRAM(S): 100 INJECTION SUBCUTANEOUS at 12:59

## 2022-05-04 RX ADMIN — PREGABALIN 1000 MICROGRAM(S): 225 CAPSULE ORAL at 14:45

## 2022-05-04 RX ADMIN — Medication 300 MILLIGRAM(S): at 06:14

## 2022-05-04 RX ADMIN — Medication 100 MILLIGRAM(S): at 12:59

## 2022-05-04 RX ADMIN — Medication 6 MILLIGRAM(S): at 21:10

## 2022-05-04 RX ADMIN — Medication 81 MILLIGRAM(S): at 12:59

## 2022-05-04 RX ADMIN — Medication 300 MILLIGRAM(S): at 14:45

## 2022-05-04 RX ADMIN — Medication 50 MILLIGRAM(S): at 21:10

## 2022-05-04 RX ADMIN — LEVETIRACETAM 1000 MILLIGRAM(S): 250 TABLET, FILM COATED ORAL at 06:14
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
## 2022-05-04 NOTE — PROGRESS NOTE ADULT - SUBJECTIVE AND OBJECTIVE BOX
Patient is a 61y old  Male who presents with a chief complaint of Functional deficits s/p CVA (03 May 2022 13:06)      Subjective and overnight events:  Patient seen and examined at bedside. +right leg edema mildly improved with leg elevated. pt reports no complaints. no fever, chills, headache, dizziness, sob, cp, abd pain.    ALLERGIES:  No Known Allergies    MEDICATIONS  (STANDING):  aspirin  chewable 81 milliGRAM(s) Oral daily  atorvastatin 40 milliGRAM(s) Oral at bedtime  cyanocobalamin 1000 MICROGram(s) Oral daily  enoxaparin Injectable 40 milliGRAM(s) SubCutaneous every 24 hours  folic acid 1 milliGRAM(s) Oral daily  hydrALAZINE 50 milliGRAM(s) Oral every 8 hours  labetalol 300 milliGRAM(s) Oral three times a day  lacosamide 200 milliGRAM(s) Oral two times a day  levETIRAcetam 1000 milliGRAM(s) Oral two times a day  melatonin 6 milliGRAM(s) Oral at bedtime  polyethylene glycol 3350 17 Gram(s) Oral every 12 hours  senna 2 Tablet(s) Oral at bedtime  thiamine 100 milliGRAM(s) Oral daily    MEDICATIONS  (PRN):  acetaminophen     Tablet .. 650 milliGRAM(s) Oral every 6 hours PRN Mild Pain (1 - 3)  bisacodyl 5 milliGRAM(s) Oral every 12 hours PRN Constipation    Vital Signs Last 24 Hrs  T(F): 98.7 (03 May 2022 20:11), Max: 98.7 (03 May 2022 20:11)  HR: 78 (04 May 2022 06:11) (78 - 87)  BP: 144/93 (04 May 2022 06:11) (114/67 - 144/93)  RR: 15 (04 May 2022 06:11) (15 - 15)  SpO2: 97% (04 May 2022 06:11) (95% - 97%)  I&O's Summary    03 May 2022 07:01  -  04 May 2022 07:00  --------------------------------------------------------  IN: 0 mL / OUT: 250 mL / NET: -250 mL      PHYSICAL EXAM:  General: NAD, A/O x 3  ENT: MMM  Neck: Supple, No JVD  Lungs: Clear to auscultation bilaterally  Cardio: RRR, S1/S2, No murmurs  Abdomen: Soft, Nontender, Nondistended; Bowel sounds present  Extremities: No calf tenderness, 1-2 + R ankle and pedal pitting edema. trace L ankle edema    LABS:                        10.5   7.43  )-----------( 268      ( 03 May 2022 06:51 )             31.9     05-03    143  |  107  |  21  ----------------------------<  103  4.0   |  27  |  1.26    Ca    8.9      03 May 2022 06:51    TPro  6.9  /  Alb  2.8  /  TBili  0.3  /  DBili  x   /  AST  31  /  ALT  62  /  AlkPhos  63  05-03          04-17 Chol 166 mg/dL LDL -- HDL 42 mg/dL Trig 58 mg/dL  TSH 2.275   TSH with FT4 reflex --  Total T3 --        COVID-19 PCR: NotDetec (04-25-22 @ 10:11)  COVID-19 PCR: NotDetec (04-19-22 @ 23:47)      RADIOLOGY & ADDITIONAL TESTS:    Care Discussed with Consultants/Other Providers: d/w rehab, cont leg elevation, dc amlodipine, replace with losartan
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
## 2022-05-04 NOTE — CHART NOTE - NSCHARTNOTEFT_GEN_A_CORE
Nutrition Follow Up Note    Source: Medical Record [X] Patient [x] Family [ ]         Diet: Regular  Pt tolerating diet with good PO intake, eating >75% of meals per nursing flow sheets. Recommend changing diet to DASH/TLC due to hx of HTN. Denies nausea, vomiting, diarrhea, constipation.     Enteral/Parenteral Nutrition: N/A    Current Weight: 194.4 lbs (5/3)  202.3 lbs (5/1)  194.2 lbs (5/1)  190.6 lbs (4/28)    Pertinent Medications: MEDICATIONS  (STANDING):  aspirin  chewable 81 milliGRAM(s) Oral daily  atorvastatin 40 milliGRAM(s) Oral at bedtime  cyanocobalamin 1000 MICROGram(s) Oral daily  enoxaparin Injectable 40 milliGRAM(s) SubCutaneous every 24 hours  folic acid 1 milliGRAM(s) Oral daily  hydrALAZINE 50 milliGRAM(s) Oral every 8 hours  labetalol 300 milliGRAM(s) Oral three times a day  lacosamide 200 milliGRAM(s) Oral two times a day  levETIRAcetam 1000 milliGRAM(s) Oral two times a day  melatonin 6 milliGRAM(s) Oral at bedtime  polyethylene glycol 3350 17 Gram(s) Oral every 12 hours  senna 2 Tablet(s) Oral at bedtime  thiamine 100 milliGRAM(s) Oral daily    MEDICATIONS  (PRN):  acetaminophen     Tablet .. 650 milliGRAM(s) Oral every 6 hours PRN Mild Pain (1 - 3)  bisacodyl 5 milliGRAM(s) Oral every 12 hours PRN Constipation      Pertinent Labs:  05-03 Na143 mmol/L Glu 103 mg/dL<H> K+ 4.0 mmol/L Cr  1.26 mg/dL BUN 21 mg/dL 05-03 Alb 2.8 g/dL<L> 04-17 Chol 166 mg/dL LDL --    HDL 42 mg/dL Trig 58 mg/dL        Skin: Skin intact per nursing flow sheets     Edema: No edema per nursing flow sheets     Last BM: on 5/2 Per nursing flowsheets     Estimated Needs:   [X] No Change since Previous Assessment  [ ] Recalculated:     Previous Nutrition Diagnosis:   Food and nutrition related knowledge deficit    Nutrition Diagnosis is [X] Ongoing         New Nutrition Diagnosis: [X] Not Applicable  [ ] Inadequate Protein Energy Intake   [ ] Inadequate Oral Intake   [ ] Excessive Energy Intake   [ ] Increased Nutrient Needs   [ ] Obesity   [ ] Altered GI Function   [ ] Unintended Weight Loss   [ ] Food & Nutrition Related Knowledge Deficit  [ ] Limited Adherence to nutrition related recommendations   [ ] Malnutrition      Interventions:   1. Recommend DASH/TLC diet    Monitoring & Evaluation:   [X] Weights   [X] PO Intake   [X] Follow Up (Per Protocol)  [X] Tolerance to Diet Prescription   [X] Other: Labs     RD Remains Available.  Marilou Zuleta RD
[facility] Plan of Care    REHABILITATION DIAGNOSIS:  cerebral infarction          COMORBIDITIES/COMPLICATING CONDITIONS IMPACTING REHABILITATION:  HEALTH ISSUES - PROBLEM Dx:        PAST MEDICAL & SURGICAL HISTORY:  HTN (hypertension)    ICH (intracerebral hemorrhage)    No significant past surgical history        Based upon consideration of the patient's impairments, functional status, complicating conditions and any other contributing factors and after information garnered from the assessments of all therapy disciplines involved in treating the patient and other pertinent clinicians:    INTERDISCIPLINARY REHABILITATION INTERVENTIONS:    [ X  ] Transfer Training  [ X  ] Bed Mobility  [ X  ] Therapeutic Exercise  [ X ] Balance/Coordination Exercises  [ X ] Locomotion retraining  [ X  ] Stairs  [  X ] Functional Transfer Training  [ X  ] Bowel/Bladder program  [ X  ] Pain Management  [ X  ] Skin/Wound Care  [ X  ] Visual/Perceptual Training  [ X  ] Therapeutic Recreation Activities  [  X ] Neuromuscular Re-education  [ X  ] Activities of Daily Living  [ X  ] Speech Exercise  [X   ] Swallowing Exercises  [   ] Vital Stim  [   ] Dietary Supplements  [   ] Calorie Count  [ X  ] Cognitive Exercises  [  X ] Congnitive/Linguistic Treatment  [  x ] Behavior Program  [  x ] Neuropsych Therapy  [ X  ] Patient/Family Counseling  [ X ] Family Training  [ X  ] Community Re-entry  [   ] Orthotic Evaluation  [   ] Prosthetic Eval/Training    MEDICAL PROGNOSIS: good      REHAB POTENTIAL:  Good    EXPECTED DAILY THERAPY:  3 hours/day           ESTIMATED LOS:  [  ] 5-7 Days  [  x] 7-10 Days  [ x ] 10- 14 Days  [  ] 14- 18 Days  [  ] 18- 21 Days    ESTIMATED DISPOSITION:  [  ] Home   [ x] Home with Outpatient Therapies  [ x ] Home with Home Therapies  [  ] Assisted Living  [  ] Nursing Home  [  ] Long Term Acute Care    INTERDISCIPLINARY FUNCTIONAL OUTCOMES/GOALS:         Gait/Mobility: 5       Transfers: 5       ADLs: 5       Functional Transfers: 5       Medication Management: 5       Communication: 5-6       Cognitive: 5       Dysphagia: 6       Bladder 7       Bowel: 7     Functional Independent Measures:   7 = Independent  6 = Modified Independent  5 = Supervision  4 = Minimal Assist/ Contact Guard  3 = Moderate Assistance  2 = Maximum Assistance  1 = Total Assistance  0 = Unable to assess

## 2022-05-05 LAB
ALBUMIN SERPL ELPH-MCNC: 2.8 G/DL — LOW (ref 3.3–5)
ALP SERPL-CCNC: 62 U/L — SIGNIFICANT CHANGE UP (ref 40–120)
ALT FLD-CCNC: 50 U/L — HIGH (ref 10–45)
ANION GAP SERPL CALC-SCNC: 7 MMOL/L — SIGNIFICANT CHANGE UP (ref 5–17)
AST SERPL-CCNC: 24 U/L — SIGNIFICANT CHANGE UP (ref 10–40)
BILIRUB SERPL-MCNC: 0.3 MG/DL — SIGNIFICANT CHANGE UP (ref 0.2–1.2)
BUN SERPL-MCNC: 18 MG/DL — SIGNIFICANT CHANGE UP (ref 7–23)
CALCIUM SERPL-MCNC: 9 MG/DL — SIGNIFICANT CHANGE UP (ref 8.4–10.5)
CHLORIDE SERPL-SCNC: 108 MMOL/L — SIGNIFICANT CHANGE UP (ref 96–108)
CO2 SERPL-SCNC: 29 MMOL/L — SIGNIFICANT CHANGE UP (ref 22–31)
CREAT SERPL-MCNC: 1.46 MG/DL — HIGH (ref 0.5–1.3)
EGFR: 54 ML/MIN/1.73M2 — LOW
GLUCOSE SERPL-MCNC: 106 MG/DL — HIGH (ref 70–99)
HCT VFR BLD CALC: 31.3 % — LOW (ref 39–50)
HGB BLD-MCNC: 10.1 G/DL — LOW (ref 13–17)
MCHC RBC-ENTMCNC: 27.3 PG — SIGNIFICANT CHANGE UP (ref 27–34)
MCHC RBC-ENTMCNC: 32.3 GM/DL — SIGNIFICANT CHANGE UP (ref 32–36)
MCV RBC AUTO: 84.6 FL — SIGNIFICANT CHANGE UP (ref 80–100)
NRBC # BLD: 0 /100 WBCS — SIGNIFICANT CHANGE UP (ref 0–0)
PLATELET # BLD AUTO: 249 K/UL — SIGNIFICANT CHANGE UP (ref 150–400)
POTASSIUM SERPL-MCNC: 4.6 MMOL/L — SIGNIFICANT CHANGE UP (ref 3.5–5.3)
POTASSIUM SERPL-SCNC: 4.6 MMOL/L — SIGNIFICANT CHANGE UP (ref 3.5–5.3)
PROT SERPL-MCNC: 6.9 G/DL — SIGNIFICANT CHANGE UP (ref 6–8.3)
RBC # BLD: 3.7 M/UL — LOW (ref 4.2–5.8)
RBC # FLD: 14.7 % — HIGH (ref 10.3–14.5)
SODIUM SERPL-SCNC: 144 MMOL/L — SIGNIFICANT CHANGE UP (ref 135–145)
WBC # BLD: 7.83 K/UL — SIGNIFICANT CHANGE UP (ref 3.8–10.5)
WBC # FLD AUTO: 7.83 K/UL — SIGNIFICANT CHANGE UP (ref 3.8–10.5)

## 2022-05-05 PROCEDURE — 99233 SBSQ HOSP IP/OBS HIGH 50: CPT

## 2022-05-05 PROCEDURE — 99232 SBSQ HOSP IP/OBS MODERATE 35: CPT

## 2022-05-05 RX ADMIN — PREGABALIN 1000 MICROGRAM(S): 225 CAPSULE ORAL at 12:49

## 2022-05-05 RX ADMIN — Medication 50 MILLIGRAM(S): at 13:39

## 2022-05-05 RX ADMIN — Medication 6 MILLIGRAM(S): at 21:36

## 2022-05-05 RX ADMIN — LOSARTAN POTASSIUM 50 MILLIGRAM(S): 100 TABLET, FILM COATED ORAL at 06:24

## 2022-05-05 RX ADMIN — LACOSAMIDE 200 MILLIGRAM(S): 50 TABLET ORAL at 18:07

## 2022-05-05 RX ADMIN — Medication 50 MILLIGRAM(S): at 06:23

## 2022-05-05 RX ADMIN — Medication 300 MILLIGRAM(S): at 06:23

## 2022-05-05 RX ADMIN — Medication 300 MILLIGRAM(S): at 13:39

## 2022-05-05 RX ADMIN — Medication 50 MILLIGRAM(S): at 21:36

## 2022-05-05 RX ADMIN — Medication 1 MILLIGRAM(S): at 12:49

## 2022-05-05 RX ADMIN — LEVETIRACETAM 1000 MILLIGRAM(S): 250 TABLET, FILM COATED ORAL at 18:08

## 2022-05-05 RX ADMIN — SENNA PLUS 2 TABLET(S): 8.6 TABLET ORAL at 21:36

## 2022-05-05 RX ADMIN — Medication 81 MILLIGRAM(S): at 12:49

## 2022-05-05 RX ADMIN — LACOSAMIDE 200 MILLIGRAM(S): 50 TABLET ORAL at 06:23

## 2022-05-05 RX ADMIN — ATORVASTATIN CALCIUM 40 MILLIGRAM(S): 80 TABLET, FILM COATED ORAL at 21:36

## 2022-05-05 RX ADMIN — ENOXAPARIN SODIUM 40 MILLIGRAM(S): 100 INJECTION SUBCUTANEOUS at 12:49

## 2022-05-05 RX ADMIN — LEVETIRACETAM 1000 MILLIGRAM(S): 250 TABLET, FILM COATED ORAL at 06:23

## 2022-05-05 RX ADMIN — Medication 300 MILLIGRAM(S): at 21:36

## 2022-05-05 RX ADMIN — Medication 100 MILLIGRAM(S): at 12:49

## 2022-05-05 NOTE — PROGRESS NOTE ADULT - ATTENDING COMMENTS
Seen and examined. Findings as stated by Rehab resident    62yo M, with traumatic Rt parietal intracranial hemorrhage with L hemiparesis    No acute med complaint on exam  Tolerating diet  Leg edema noted, no cough, edema is non pitting    Observed in therapy, increasing distance, transiting from walker to cane  Deficit with balance and co ordination    MMT 4+/5 left side 5/5 on right  Elevated Cr,   Clinically stable fully oriented and alert    Continue PT/OT/SLP   Stop amlodipine  Repeat BMP tomorrow, if Cr still rising, hold ACEI  Est dc as stated at last team conference

## 2022-05-05 NOTE — PROGRESS NOTE ADULT - SUBJECTIVE AND OBJECTIVE BOX
Patient is a 61y old  Male who presents with a chief complaint of Functional deficits s/p CVA (04 May 2022 15:08)      Subjective and overnight events:  Patient seen and examined at bedside. pt reports no complaints. no fever, chills, sob, cp, abd pain. right ankle swelling better    ALLERGIES:  No Known Allergies    MEDICATIONS  (STANDING):  aspirin  chewable 81 milliGRAM(s) Oral daily  atorvastatin 40 milliGRAM(s) Oral at bedtime  cyanocobalamin 1000 MICROGram(s) Oral daily  enoxaparin Injectable 40 milliGRAM(s) SubCutaneous every 24 hours  folic acid 1 milliGRAM(s) Oral daily  hydrALAZINE 50 milliGRAM(s) Oral every 8 hours  labetalol 300 milliGRAM(s) Oral three times a day  lacosamide 200 milliGRAM(s) Oral two times a day  levETIRAcetam 1000 milliGRAM(s) Oral two times a day  melatonin 6 milliGRAM(s) Oral at bedtime  polyethylene glycol 3350 17 Gram(s) Oral every 12 hours  senna 2 Tablet(s) Oral at bedtime  thiamine 100 milliGRAM(s) Oral daily    MEDICATIONS  (PRN):  acetaminophen     Tablet .. 650 milliGRAM(s) Oral every 6 hours PRN Mild Pain (1 - 3)  bisacodyl 5 milliGRAM(s) Oral every 12 hours PRN Constipation    Vital Signs Last 24 Hrs  T(F): 98.6 (05 May 2022 07:45), Max: 98.6 (05 May 2022 07:45)  HR: 78 (05 May 2022 07:45) (77 - 82)  BP: 123/78 (05 May 2022 07:45) (123/78 - 139/82)  RR: 16 (05 May 2022 07:45) (15 - 16)  SpO2: 96% (05 May 2022 07:45) (96% - 98%)  I&O's Summary    04 May 2022 07:01  -  05 May 2022 07:00  --------------------------------------------------------  IN: 0 mL / OUT: 350 mL / NET: -350 mL      PHYSICAL EXAM:  General: NAD, A/O x 3  ENT: MMM  Neck: Supple, No JVD  Lungs: Clear to auscultation bilaterally  Cardio: RRR, S1/S2, No murmurs  Abdomen: Soft, Nontender, Nondistended; Bowel sounds present  Extremities: No calf tenderness, right ankle pitting edema (improved)    LABS:                        10.1   7.83  )-----------( 249      ( 05 May 2022 06:15 )             31.3     05-05    144  |  108  |  18  ----------------------------<  106  4.6   |  29  |  1.46    Ca    9.0      05 May 2022 06:15    TPro  6.9  /  Alb  2.8  /  TBili  0.3  /  DBili  x   /  AST  24  /  ALT  50  /  AlkPhos  62  05-05        04-17 Chol 166 mg/dL LDL -- HDL 42 mg/dL Trig 58 mg/dL              COVID-19 PCR: Angelitatec (04-25-22 @ 10:11)  COVID-19 PCR: Angelitatenicola (04-19-22 @ 23:47)      RADIOLOGY & ADDITIONAL TESTS:    Care Discussed with Consultants/Other Providers: d/w rehab, hold off amlodipine
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
## 2022-05-06 PROBLEM — Z78.9 OTHER SPECIFIED HEALTH STATUS: Chronic | Status: INACTIVE | Noted: 2022-04-20 | Resolved: 2022-04-26

## 2022-05-06 LAB
ANION GAP SERPL CALC-SCNC: 6 MMOL/L — SIGNIFICANT CHANGE UP (ref 5–17)
BUN SERPL-MCNC: 16 MG/DL — SIGNIFICANT CHANGE UP (ref 7–23)
CALCIUM SERPL-MCNC: 8.8 MG/DL — SIGNIFICANT CHANGE UP (ref 8.4–10.5)
CHLORIDE SERPL-SCNC: 108 MMOL/L — SIGNIFICANT CHANGE UP (ref 96–108)
CO2 SERPL-SCNC: 28 MMOL/L — SIGNIFICANT CHANGE UP (ref 22–31)
CREAT SERPL-MCNC: 1.28 MG/DL — SIGNIFICANT CHANGE UP (ref 0.5–1.3)
EGFR: 63 ML/MIN/1.73M2 — SIGNIFICANT CHANGE UP
GLUCOSE SERPL-MCNC: 104 MG/DL — HIGH (ref 70–99)
POTASSIUM SERPL-MCNC: 4.3 MMOL/L — SIGNIFICANT CHANGE UP (ref 3.5–5.3)
POTASSIUM SERPL-SCNC: 4.3 MMOL/L — SIGNIFICANT CHANGE UP (ref 3.5–5.3)
SODIUM SERPL-SCNC: 142 MMOL/L — SIGNIFICANT CHANGE UP (ref 135–145)

## 2022-05-06 PROCEDURE — 99232 SBSQ HOSP IP/OBS MODERATE 35: CPT

## 2022-05-06 RX ORDER — LACOSAMIDE 50 MG/1
1 TABLET ORAL
Qty: 60 | Refills: 0
Start: 2022-05-06

## 2022-05-06 RX ORDER — LOSARTAN POTASSIUM 100 MG/1
50 TABLET, FILM COATED ORAL DAILY
Refills: 0 | Status: DISCONTINUED | OUTPATIENT
Start: 2022-05-06 | End: 2022-05-10

## 2022-05-06 RX ADMIN — LACOSAMIDE 200 MILLIGRAM(S): 50 TABLET ORAL at 18:08

## 2022-05-06 RX ADMIN — ATORVASTATIN CALCIUM 40 MILLIGRAM(S): 80 TABLET, FILM COATED ORAL at 22:40

## 2022-05-06 RX ADMIN — Medication 300 MILLIGRAM(S): at 22:40

## 2022-05-06 RX ADMIN — LACOSAMIDE 200 MILLIGRAM(S): 50 TABLET ORAL at 05:15

## 2022-05-06 RX ADMIN — Medication 50 MILLIGRAM(S): at 22:40

## 2022-05-06 RX ADMIN — ENOXAPARIN SODIUM 40 MILLIGRAM(S): 100 INJECTION SUBCUTANEOUS at 12:13

## 2022-05-06 RX ADMIN — Medication 300 MILLIGRAM(S): at 14:29

## 2022-05-06 RX ADMIN — PREGABALIN 1000 MICROGRAM(S): 225 CAPSULE ORAL at 12:12

## 2022-05-06 RX ADMIN — Medication 81 MILLIGRAM(S): at 12:12

## 2022-05-06 RX ADMIN — Medication 100 MILLIGRAM(S): at 12:12

## 2022-05-06 RX ADMIN — Medication 50 MILLIGRAM(S): at 14:29

## 2022-05-06 RX ADMIN — LEVETIRACETAM 1000 MILLIGRAM(S): 250 TABLET, FILM COATED ORAL at 18:07

## 2022-05-06 RX ADMIN — Medication 50 MILLIGRAM(S): at 05:15

## 2022-05-06 RX ADMIN — LEVETIRACETAM 1000 MILLIGRAM(S): 250 TABLET, FILM COATED ORAL at 05:15

## 2022-05-06 RX ADMIN — Medication 300 MILLIGRAM(S): at 05:15

## 2022-05-06 RX ADMIN — Medication 1 MILLIGRAM(S): at 12:12
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
## 2022-05-06 NOTE — PROGRESS NOTE ADULT - ATTENDING COMMENTS
How Severe Are Your Spot(S)?: moderate Have Your Spot(S) Been Treated In The Past?: has not been treated Hpi Title: Evaluation of Skin Lesions Pt. seen with resident.  Agree with documentation above as per resident with amendments made as appropriate. Patient medically stable. Making progress towards rehab goals.     right ICH  Stable-- LE edema improving.
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
## 2022-05-07 PROCEDURE — 99232 SBSQ HOSP IP/OBS MODERATE 35: CPT

## 2022-05-07 RX ADMIN — Medication 50 MILLIGRAM(S): at 05:56

## 2022-05-07 RX ADMIN — Medication 300 MILLIGRAM(S): at 05:56

## 2022-05-07 RX ADMIN — Medication 50 MILLIGRAM(S): at 22:25

## 2022-05-07 RX ADMIN — LACOSAMIDE 200 MILLIGRAM(S): 50 TABLET ORAL at 05:56

## 2022-05-07 RX ADMIN — Medication 300 MILLIGRAM(S): at 22:25

## 2022-05-07 RX ADMIN — ENOXAPARIN SODIUM 40 MILLIGRAM(S): 100 INJECTION SUBCUTANEOUS at 12:34

## 2022-05-07 RX ADMIN — PREGABALIN 1000 MICROGRAM(S): 225 CAPSULE ORAL at 12:34

## 2022-05-07 RX ADMIN — Medication 300 MILLIGRAM(S): at 14:37

## 2022-05-07 RX ADMIN — LEVETIRACETAM 1000 MILLIGRAM(S): 250 TABLET, FILM COATED ORAL at 17:19

## 2022-05-07 RX ADMIN — LOSARTAN POTASSIUM 50 MILLIGRAM(S): 100 TABLET, FILM COATED ORAL at 05:56

## 2022-05-07 RX ADMIN — Medication 1 MILLIGRAM(S): at 12:34

## 2022-05-07 RX ADMIN — Medication 50 MILLIGRAM(S): at 14:37

## 2022-05-07 RX ADMIN — Medication 100 MILLIGRAM(S): at 12:34

## 2022-05-07 RX ADMIN — Medication 81 MILLIGRAM(S): at 12:34

## 2022-05-07 RX ADMIN — LACOSAMIDE 200 MILLIGRAM(S): 50 TABLET ORAL at 17:20

## 2022-05-07 RX ADMIN — ATORVASTATIN CALCIUM 40 MILLIGRAM(S): 80 TABLET, FILM COATED ORAL at 22:25

## 2022-05-07 RX ADMIN — LEVETIRACETAM 1000 MILLIGRAM(S): 250 TABLET, FILM COATED ORAL at 05:56

## 2022-05-07 NOTE — PROGRESS NOTE ADULT - SUBJECTIVE AND OBJECTIVE BOX
Chief complaint: no new complaints     Patient is a 61y old  Male who presents with a chief complaint of Functional deficits s/p CVA (07 May 2022 13:18)    PMHx -   PAST MEDICAL & SURGICAL HISTORY:  HTN (hypertension)    ICH (intracerebral hemorrhage)    No significant past surgical history        VITALS  Vital Signs Last 24 Hrs  T(C): 36.6 (07 May 2022 08:43), Max: 37 (06 May 2022 20:56)  T(F): 97.8 (07 May 2022 08:43), Max: 98.6 (06 May 2022 20:56)  HR: 80 (07 May 2022 14:36) (69 - 83)  BP: 146/75 (07 May 2022 14:36) (129/67 - 146/75)  BP(mean): --  RR: 14 (07 May 2022 08:43) (14 - 14)  SpO2: 96% (07 May 2022 08:43) (96% - 97%)      PHYSICAL EXAM  Constitutional - NAD, Comfortable  HEENT - NCAT, EOMI  Neck - Supple, No limited ROM  Chest - CTA bilaterally  Cardiovascular - RRR, S1S2  Abdomen - Soft, NTND  Extremities -  No calf tenderness   Neurologic Exam -                    Cognitive - Awake, Alert     No new focal deficits                    RECENT LABS    05-06    142  |  108  |  16  ----------------------------<  104<H>  4.3   |  28  |  1.28    Ca    8.8      06 May 2022 06:20              CURRENT MEDICATIONS    MEDICATIONS  (STANDING):  aspirin  chewable 81 milliGRAM(s) Oral daily  atorvastatin 40 milliGRAM(s) Oral at bedtime  cyanocobalamin 1000 MICROGram(s) Oral daily  enoxaparin Injectable 40 milliGRAM(s) SubCutaneous every 24 hours  folic acid 1 milliGRAM(s) Oral daily  hydrALAZINE 50 milliGRAM(s) Oral every 8 hours  labetalol 300 milliGRAM(s) Oral three times a day  lacosamide 200 milliGRAM(s) Oral two times a day  levETIRAcetam 1000 milliGRAM(s) Oral two times a day  losartan 50 milliGRAM(s) Oral daily  melatonin 6 milliGRAM(s) Oral at bedtime  polyethylene glycol 3350 17 Gram(s) Oral every 12 hours  senna 2 Tablet(s) Oral at bedtime  thiamine 100 milliGRAM(s) Oral daily    MEDICATIONS  (PRN):  acetaminophen     Tablet .. 650 milliGRAM(s) Oral every 6 hours PRN Mild Pain (1 - 3)  bisacodyl 5 milliGRAM(s) Oral every 12 hours PRN Constipation    ASSESSMENT & PLAN          GI/Bowel Management - Dulcolax PRN, Fleet PRN   Management - Toilet Q2  Skin - Turn Q2  Pain - Tylenol PRN  DVT PPX - Lovenox      Continue comprehensive acute rehab program consisting of 3hrs/day of OT/PT and SLP.
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
## 2022-05-07 NOTE — PROGRESS NOTE ADULT - SUBJECTIVE AND OBJECTIVE BOX
Patient seen and examined at bedside. Patient is w/o complaints    ALLERGIES:  No Known Allergies    MEDICATIONS  (STANDING):  aspirin  chewable 81 milliGRAM(s) Oral daily  atorvastatin 40 milliGRAM(s) Oral at bedtime  cyanocobalamin 1000 MICROGram(s) Oral daily  enoxaparin Injectable 40 milliGRAM(s) SubCutaneous every 24 hours  folic acid 1 milliGRAM(s) Oral daily  hydrALAZINE 50 milliGRAM(s) Oral every 8 hours  labetalol 300 milliGRAM(s) Oral three times a day  lacosamide 200 milliGRAM(s) Oral two times a day  levETIRAcetam 1000 milliGRAM(s) Oral two times a day  losartan 50 milliGRAM(s) Oral daily  melatonin 6 milliGRAM(s) Oral at bedtime  polyethylene glycol 3350 17 Gram(s) Oral every 12 hours  senna 2 Tablet(s) Oral at bedtime  thiamine 100 milliGRAM(s) Oral daily    MEDICATIONS  (PRN):  acetaminophen     Tablet .. 650 milliGRAM(s) Oral every 6 hours PRN Mild Pain (1 - 3)  bisacodyl 5 milliGRAM(s) Oral every 12 hours PRN Constipation    Vital Signs Last 24 Hrs  T(F): 97.8 (07 May 2022 08:43), Max: 98.6 (06 May 2022 20:56)  HR: 69 (07 May 2022 08:43) (69 - 84)  BP: 129/67 (07 May 2022 08:43) (114/67 - 139/92)  RR: 14 (07 May 2022 08:43) (14 - 16)  SpO2: 96% (07 May 2022 08:43) (96% - 97%)  I&O's Summary    06 May 2022 07:01  -  07 May 2022 07:00  --------------------------------------------------------  IN: 0 mL / OUT: 2150 mL / NET: -2150 mL        PHYSICAL EXAM:  Gen: nad, resting in bed  Neuro: aaox3, no focal deficits  Heent: eomi b/l, no jvd, no oral exudates  Pulm: cta b/l, no w/r/r  CV: +s1s2, no m/r/g  Ab: soft, nt/nd, normoactive bs x 4  Extrem: no edema, pulses intact and equal      LABS:                        10.1   7.83  )-----------( 249      ( 05 May 2022 06:15 )             31.3       05-06    142  |  108  |  16  ----------------------------<  104  4.3   |  28  |  1.28    Ca    8.8      06 May 2022 06:20    TPro  6.9  /  Alb  2.8  /  TBili  0.3  /  DBili  x   /  AST  24  /  ALT  50  /  AlkPhos  62  05-05                04-17 Chol 166 mg/dL LDL -- HDL 42 mg/dL Trig 58 mg/dL                      COVID-19 PCR: NotDetec (04-25-22 @ 10:11)  COVID-19 PCR: NotDetec (04-19-22 @ 23:47)      RADIOLOGY & ADDITIONAL TESTS:    Care Discussed with Consultants/Other Providers:

## 2022-05-08 LAB
ANION GAP SERPL CALC-SCNC: 7 MMOL/L — SIGNIFICANT CHANGE UP (ref 5–17)
BUN SERPL-MCNC: 16 MG/DL — SIGNIFICANT CHANGE UP (ref 7–23)
CALCIUM SERPL-MCNC: 8.8 MG/DL — SIGNIFICANT CHANGE UP (ref 8.4–10.5)
CHLORIDE SERPL-SCNC: 104 MMOL/L — SIGNIFICANT CHANGE UP (ref 96–108)
CO2 SERPL-SCNC: 27 MMOL/L — SIGNIFICANT CHANGE UP (ref 22–31)
CREAT SERPL-MCNC: 1.19 MG/DL — SIGNIFICANT CHANGE UP (ref 0.5–1.3)
EGFR: 69 ML/MIN/1.73M2 — SIGNIFICANT CHANGE UP
GLUCOSE SERPL-MCNC: 100 MG/DL — HIGH (ref 70–99)
POTASSIUM SERPL-MCNC: 4 MMOL/L — SIGNIFICANT CHANGE UP (ref 3.5–5.3)
POTASSIUM SERPL-SCNC: 4 MMOL/L — SIGNIFICANT CHANGE UP (ref 3.5–5.3)
SODIUM SERPL-SCNC: 138 MMOL/L — SIGNIFICANT CHANGE UP (ref 135–145)

## 2022-05-08 PROCEDURE — 99232 SBSQ HOSP IP/OBS MODERATE 35: CPT

## 2022-05-08 RX ADMIN — ENOXAPARIN SODIUM 40 MILLIGRAM(S): 100 INJECTION SUBCUTANEOUS at 12:12

## 2022-05-08 RX ADMIN — Medication 300 MILLIGRAM(S): at 05:27

## 2022-05-08 RX ADMIN — Medication 300 MILLIGRAM(S): at 14:48

## 2022-05-08 RX ADMIN — PREGABALIN 1000 MICROGRAM(S): 225 CAPSULE ORAL at 12:12

## 2022-05-08 RX ADMIN — Medication 300 MILLIGRAM(S): at 21:11

## 2022-05-08 RX ADMIN — LEVETIRACETAM 1000 MILLIGRAM(S): 250 TABLET, FILM COATED ORAL at 17:11

## 2022-05-08 RX ADMIN — Medication 50 MILLIGRAM(S): at 21:11

## 2022-05-08 RX ADMIN — Medication 6 MILLIGRAM(S): at 21:11

## 2022-05-08 RX ADMIN — LACOSAMIDE 200 MILLIGRAM(S): 50 TABLET ORAL at 17:11

## 2022-05-08 RX ADMIN — Medication 100 MILLIGRAM(S): at 12:12

## 2022-05-08 RX ADMIN — Medication 50 MILLIGRAM(S): at 05:27

## 2022-05-08 RX ADMIN — Medication 81 MILLIGRAM(S): at 12:12

## 2022-05-08 RX ADMIN — Medication 1 MILLIGRAM(S): at 12:12

## 2022-05-08 RX ADMIN — LOSARTAN POTASSIUM 50 MILLIGRAM(S): 100 TABLET, FILM COATED ORAL at 05:27

## 2022-05-08 RX ADMIN — LEVETIRACETAM 1000 MILLIGRAM(S): 250 TABLET, FILM COATED ORAL at 05:27

## 2022-05-08 RX ADMIN — SENNA PLUS 2 TABLET(S): 8.6 TABLET ORAL at 21:11

## 2022-05-08 RX ADMIN — LACOSAMIDE 200 MILLIGRAM(S): 50 TABLET ORAL at 05:27

## 2022-05-08 RX ADMIN — Medication 50 MILLIGRAM(S): at 14:47

## 2022-05-08 RX ADMIN — ATORVASTATIN CALCIUM 40 MILLIGRAM(S): 80 TABLET, FILM COATED ORAL at 21:11

## 2022-05-08 RX ADMIN — POLYETHYLENE GLYCOL 3350 17 GRAM(S): 17 POWDER, FOR SOLUTION ORAL at 17:11
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
## 2022-05-08 NOTE — PROGRESS NOTE ADULT - SUBJECTIVE AND OBJECTIVE BOX
Patient seen and examined at bedside. Patient is w/o complaints this morning    ALLERGIES:  No Known Allergies    MEDICATIONS  (STANDING):  aspirin  chewable 81 milliGRAM(s) Oral daily  atorvastatin 40 milliGRAM(s) Oral at bedtime  cyanocobalamin 1000 MICROGram(s) Oral daily  enoxaparin Injectable 40 milliGRAM(s) SubCutaneous every 24 hours  folic acid 1 milliGRAM(s) Oral daily  hydrALAZINE 50 milliGRAM(s) Oral every 8 hours  labetalol 300 milliGRAM(s) Oral three times a day  lacosamide 200 milliGRAM(s) Oral two times a day  levETIRAcetam 1000 milliGRAM(s) Oral two times a day  losartan 50 milliGRAM(s) Oral daily  melatonin 6 milliGRAM(s) Oral at bedtime  polyethylene glycol 3350 17 Gram(s) Oral every 12 hours  senna 2 Tablet(s) Oral at bedtime  thiamine 100 milliGRAM(s) Oral daily    MEDICATIONS  (PRN):  acetaminophen     Tablet .. 650 milliGRAM(s) Oral every 6 hours PRN Mild Pain (1 - 3)  bisacodyl 5 milliGRAM(s) Oral every 12 hours PRN Constipation    Vital Signs Last 24 Hrs  T(F): 98.2 (08 May 2022 08:13), Max: 98.4 (07 May 2022 20:17)  HR: 77 (08 May 2022 08:13) (76 - 84)  BP: 120/66 (08 May 2022 08:13) (120/66 - 156/85)  RR: 16 (08 May 2022 08:13) (15 - 16)  SpO2: 95% (08 May 2022 08:13) (95% - 96%)  I&O's Summary    07 May 2022 07:01  -  08 May 2022 07:00  --------------------------------------------------------  IN: 600 mL / OUT: 1650 mL / NET: -1050 mL        PHYSICAL EXAM:  Gen: nad, resting in bed  Neuro: aaox3, unchanged  Heent: eomi b/l, no jvd, no oral exudates  Pulm: cta b/l, no w/r/r  CV: +s1s2, no m/r/g  Ab: soft, nt/nd, normoactive bs x 4  Extrem: no edema, pulses intact and equal      LABS:      05-08    138  |  104  |  16  ----------------------------<  100  4.0   |  27  |  1.19    Ca    8.8      08 May 2022 06:55                  04-17 Chol 166 mg/dL LDL -- HDL 42 mg/dL Trig 58 mg/dL                      COVID-19 PCR: NotDetec (04-25-22 @ 10:11)  COVID-19 PCR: NotDetec (04-19-22 @ 23:47)      RADIOLOGY & ADDITIONAL TESTS:    Care Discussed with Consultants/Other Providers:

## 2022-05-08 NOTE — PROGRESS NOTE ADULT - SUBJECTIVE AND OBJECTIVE BOX
Chief complaint: no new complaints     Patient is a 61y old  Male who presents with a chief complaint of Functional deficits s/p CVA (08 May 2022 11:17)    PAST MEDICAL & SURGICAL HISTORY:  HTN (hypertension)    ICH (intracerebral hemorrhage)    No significant past surgical history    VITALS  Vital Signs Last 24 Hrs  T(C): 36.8 (08 May 2022 08:13), Max: 36.9 (07 May 2022 20:17)  T(F): 98.2 (08 May 2022 08:13), Max: 98.4 (07 May 2022 20:17)  HR: 77 (08 May 2022 08:13) (76 - 84)  BP: 120/66 (08 May 2022 08:13) (120/66 - 156/85)  BP(mean): --  RR: 16 (08 May 2022 08:13) (15 - 16)  SpO2: 95% (08 May 2022 08:13) (95% - 96%)      PHYSICAL EXAM  Constitutional - NAD, Comfortable  HEENT - NCAT, EOMI  Neck - Supple, No limited ROM  Chest - CTA bilaterally  Cardiovascular - RRR, S1S2  Abdomen -  Soft, NTND  Extremities - No calf tenderness   Neurologic Exam -                    Cognitive - Awake, Alert     No new focal deficits                    RECENT LABS    05-08    138  |  104  |  16  ----------------------------<  100<H>  4.0   |  27  |  1.19    Ca    8.8      08 May 2022 06:55      CURRENT MEDICATIONS    MEDICATIONS  (STANDING):  aspirin  chewable 81 milliGRAM(s) Oral daily  atorvastatin 40 milliGRAM(s) Oral at bedtime  cyanocobalamin 1000 MICROGram(s) Oral daily  enoxaparin Injectable 40 milliGRAM(s) SubCutaneous every 24 hours  folic acid 1 milliGRAM(s) Oral daily  hydrALAZINE 50 milliGRAM(s) Oral every 8 hours  labetalol 300 milliGRAM(s) Oral three times a day  lacosamide 200 milliGRAM(s) Oral two times a day  levETIRAcetam 1000 milliGRAM(s) Oral two times a day  losartan 50 milliGRAM(s) Oral daily  melatonin 6 milliGRAM(s) Oral at bedtime  polyethylene glycol 3350 17 Gram(s) Oral every 12 hours  senna 2 Tablet(s) Oral at bedtime  thiamine 100 milliGRAM(s) Oral daily    MEDICATIONS  (PRN):  acetaminophen     Tablet .. 650 milliGRAM(s) Oral every 6 hours PRN Mild Pain (1 - 3)  bisacodyl 5 milliGRAM(s) Oral every 12 hours PRN Constipation    ASSESSMENT & PLAN          GI/Bowel Management - Dulcolax PRN, Fleet PRN   Management - Toilet Q2  Skin - Turn Q2  Pain - Tylenol PRN  DVT PPX - Lovenox      Continue comprehensive acute rehab program consisting of 3hrs/day of OT/PT and SLP.

## 2022-05-09 ENCOUNTER — TRANSCRIPTION ENCOUNTER (OUTPATIENT)
Age: 62
End: 2022-05-09

## 2022-05-09 LAB
ALBUMIN SERPL ELPH-MCNC: 2.9 G/DL — LOW (ref 3.3–5)
ALP SERPL-CCNC: 60 U/L — SIGNIFICANT CHANGE UP (ref 40–120)
ALT FLD-CCNC: 48 U/L — HIGH (ref 10–45)
ANION GAP SERPL CALC-SCNC: 9 MMOL/L — SIGNIFICANT CHANGE UP (ref 5–17)
AST SERPL-CCNC: 25 U/L — SIGNIFICANT CHANGE UP (ref 10–40)
BILIRUB SERPL-MCNC: 0.3 MG/DL — SIGNIFICANT CHANGE UP (ref 0.2–1.2)
BUN SERPL-MCNC: 18 MG/DL — SIGNIFICANT CHANGE UP (ref 7–23)
CALCIUM SERPL-MCNC: 8.8 MG/DL — SIGNIFICANT CHANGE UP (ref 8.4–10.5)
CHLORIDE SERPL-SCNC: 106 MMOL/L — SIGNIFICANT CHANGE UP (ref 96–108)
CO2 SERPL-SCNC: 27 MMOL/L — SIGNIFICANT CHANGE UP (ref 22–31)
CREAT SERPL-MCNC: 1.22 MG/DL — SIGNIFICANT CHANGE UP (ref 0.5–1.3)
EGFR: 67 ML/MIN/1.73M2 — SIGNIFICANT CHANGE UP
GLUCOSE SERPL-MCNC: 95 MG/DL — SIGNIFICANT CHANGE UP (ref 70–99)
HCT VFR BLD CALC: 30.7 % — LOW (ref 39–50)
HGB BLD-MCNC: 10.1 G/DL — LOW (ref 13–17)
MCHC RBC-ENTMCNC: 27.2 PG — SIGNIFICANT CHANGE UP (ref 27–34)
MCHC RBC-ENTMCNC: 32.9 GM/DL — SIGNIFICANT CHANGE UP (ref 32–36)
MCV RBC AUTO: 82.7 FL — SIGNIFICANT CHANGE UP (ref 80–100)
NRBC # BLD: 0 /100 WBCS — SIGNIFICANT CHANGE UP (ref 0–0)
PLATELET # BLD AUTO: 232 K/UL — SIGNIFICANT CHANGE UP (ref 150–400)
POTASSIUM SERPL-MCNC: 4.1 MMOL/L — SIGNIFICANT CHANGE UP (ref 3.5–5.3)
POTASSIUM SERPL-SCNC: 4.1 MMOL/L — SIGNIFICANT CHANGE UP (ref 3.5–5.3)
PROT SERPL-MCNC: 6.8 G/DL — SIGNIFICANT CHANGE UP (ref 6–8.3)
RBC # BLD: 3.71 M/UL — LOW (ref 4.2–5.8)
RBC # FLD: 14.6 % — HIGH (ref 10.3–14.5)
SODIUM SERPL-SCNC: 142 MMOL/L — SIGNIFICANT CHANGE UP (ref 135–145)
WBC # BLD: 6.44 K/UL — SIGNIFICANT CHANGE UP (ref 3.8–10.5)
WBC # FLD AUTO: 6.44 K/UL — SIGNIFICANT CHANGE UP (ref 3.8–10.5)

## 2022-05-09 PROCEDURE — 99232 SBSQ HOSP IP/OBS MODERATE 35: CPT

## 2022-05-09 RX ORDER — LEVETIRACETAM 250 MG/1
1 TABLET, FILM COATED ORAL
Qty: 60 | Refills: 0
Start: 2022-05-09 | End: 2022-06-07

## 2022-05-09 RX ORDER — LABETALOL HCL 100 MG
1 TABLET ORAL
Qty: 90 | Refills: 0
Start: 2022-05-09 | End: 2022-06-07

## 2022-05-09 RX ORDER — HYDRALAZINE HCL 50 MG
1 TABLET ORAL
Qty: 90 | Refills: 0
Start: 2022-05-09 | End: 2022-06-07

## 2022-05-09 RX ORDER — LOSARTAN POTASSIUM 100 MG/1
1 TABLET, FILM COATED ORAL
Qty: 30 | Refills: 0
Start: 2022-05-09 | End: 2022-06-07

## 2022-05-09 RX ORDER — THIAMINE MONONITRATE (VIT B1) 100 MG
1 TABLET ORAL
Qty: 0 | Refills: 0 | DISCHARGE
Start: 2022-05-09

## 2022-05-09 RX ORDER — ASPIRIN/CALCIUM CARB/MAGNESIUM 324 MG
1 TABLET ORAL
Qty: 0 | Refills: 0 | DISCHARGE
Start: 2022-05-09

## 2022-05-09 RX ORDER — LANOLIN ALCOHOL/MO/W.PET/CERES
2 CREAM (GRAM) TOPICAL
Qty: 0 | Refills: 0 | DISCHARGE
Start: 2022-05-09

## 2022-05-09 RX ORDER — POLYETHYLENE GLYCOL 3350 17 G/17G
17 POWDER, FOR SOLUTION ORAL
Qty: 0 | Refills: 0 | DISCHARGE
Start: 2022-05-09

## 2022-05-09 RX ORDER — PREGABALIN 225 MG/1
1 CAPSULE ORAL
Qty: 0 | Refills: 0 | DISCHARGE
Start: 2022-05-09

## 2022-05-09 RX ORDER — ATORVASTATIN CALCIUM 80 MG/1
1 TABLET, FILM COATED ORAL
Qty: 30 | Refills: 0
Start: 2022-05-09 | End: 2022-06-07

## 2022-05-09 RX ORDER — FOLIC ACID 0.8 MG
1 TABLET ORAL
Qty: 0 | Refills: 0 | DISCHARGE
Start: 2022-05-09

## 2022-05-09 RX ADMIN — Medication 50 MILLIGRAM(S): at 06:54

## 2022-05-09 RX ADMIN — LOSARTAN POTASSIUM 50 MILLIGRAM(S): 100 TABLET, FILM COATED ORAL at 06:54

## 2022-05-09 RX ADMIN — Medication 300 MILLIGRAM(S): at 06:54

## 2022-05-09 RX ADMIN — Medication 1 MILLIGRAM(S): at 12:54

## 2022-05-09 RX ADMIN — Medication 50 MILLIGRAM(S): at 21:12

## 2022-05-09 RX ADMIN — Medication 81 MILLIGRAM(S): at 12:54

## 2022-05-09 RX ADMIN — ATORVASTATIN CALCIUM 40 MILLIGRAM(S): 80 TABLET, FILM COATED ORAL at 21:12

## 2022-05-09 RX ADMIN — Medication 300 MILLIGRAM(S): at 21:12

## 2022-05-09 RX ADMIN — ENOXAPARIN SODIUM 40 MILLIGRAM(S): 100 INJECTION SUBCUTANEOUS at 12:54

## 2022-05-09 RX ADMIN — PREGABALIN 1000 MICROGRAM(S): 225 CAPSULE ORAL at 12:54

## 2022-05-09 RX ADMIN — LACOSAMIDE 200 MILLIGRAM(S): 50 TABLET ORAL at 06:56

## 2022-05-09 RX ADMIN — Medication 300 MILLIGRAM(S): at 14:35

## 2022-05-09 RX ADMIN — Medication 50 MILLIGRAM(S): at 14:35

## 2022-05-09 RX ADMIN — LACOSAMIDE 200 MILLIGRAM(S): 50 TABLET ORAL at 18:03

## 2022-05-09 RX ADMIN — LEVETIRACETAM 1000 MILLIGRAM(S): 250 TABLET, FILM COATED ORAL at 18:03

## 2022-05-09 RX ADMIN — LEVETIRACETAM 1000 MILLIGRAM(S): 250 TABLET, FILM COATED ORAL at 06:54

## 2022-05-09 RX ADMIN — Medication 100 MILLIGRAM(S): at 12:54
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
## 2022-05-09 NOTE — DISCHARGE NOTE NURSING/CASE MANAGEMENT/SOCIAL WORK - NSDCPEFALRISK_GEN_ALL_CORE
For information on Fall & Injury Prevention, visit: [email or web address] OR  [email or web address] OR  https://www.cdc.gov/steadi/patient.html
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
## 2022-05-09 NOTE — DISCHARGE NOTE NURSING/CASE MANAGEMENT/SOCIAL WORK - NSDCFUADDAPPT_GEN_ALL_CORE_FT
The following was scheduled:  Primary Care Doctor: Dr. Genny Miller  Phone: [phone]  Address [address] [address]  Date: 5/11/22  Time: 1:15pm   * Please bring with you photo id, insurance card and discharge summary. Please arrive 15 minutes early.

## 2022-05-09 NOTE — DISCHARGE NOTE NURSING/CASE MANAGEMENT/SOCIAL WORK - PATIENT PORTAL LINK FT
You can access the FollowMyHealth Patient Portal offered by [facility] by registering at the following website: [email or web address]. By joining [email or web address] [email or web address] portal, you will also be able to view your health information using other applications (apps) compatible with our system.

## 2022-05-09 NOTE — PROGRESS NOTE ADULT - ATTENDING COMMENTS
Pt. seen with resident.  Agree with documentation above as per resident with amendments made as appropriate. Patient medically stable. Making progress towards rehab goals.     right IPH  Stable.    Family training today  completed disability paperwork.

## 2022-05-10 VITALS
HEART RATE: 76 BPM | SYSTOLIC BLOOD PRESSURE: 128 MMHG | RESPIRATION RATE: 16 BRPM | DIASTOLIC BLOOD PRESSURE: 73 MMHG | TEMPERATURE: 98 F

## 2022-05-10 PROCEDURE — 99232 SBSQ HOSP IP/OBS MODERATE 35: CPT

## 2022-05-10 PROCEDURE — 99238 HOSP IP/OBS DSCHRG MGMT 30/<: CPT

## 2022-05-10 RX ADMIN — LACOSAMIDE 200 MILLIGRAM(S): 50 TABLET ORAL at 05:49

## 2022-05-10 RX ADMIN — Medication 50 MILLIGRAM(S): at 05:49

## 2022-05-10 RX ADMIN — LEVETIRACETAM 1000 MILLIGRAM(S): 250 TABLET, FILM COATED ORAL at 05:49

## 2022-05-10 RX ADMIN — LOSARTAN POTASSIUM 50 MILLIGRAM(S): 100 TABLET, FILM COATED ORAL at 05:49

## 2022-05-10 RX ADMIN — Medication 300 MILLIGRAM(S): at 05:49

## 2022-05-10 NOTE — PROGRESS NOTE ADULT - ATTENDING COMMENTS
Pt. seen with resident.  Agree with documentation above as per resident with amendments made as appropriate. Patient medically stable. Making progress towards rehab goals.     right ICH  Patient medically stable for discharge to home today.  Discharge medications and instructions reviewed with patient and pt's wife.
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
## 2022-05-10 NOTE — PROGRESS NOTE ADULT - REASON FOR ADMISSION
Functional deficits s/p CVA

## 2022-05-10 NOTE — PROGRESS NOTE ADULT - PROVIDER SPECIALTY LIST ADULT
Hospitalist
Rehab Medicine
Neurology
Neurology
Rehab Medicine
Rehab Medicine
Hospitalist
Physiatry
Physiatry
Rehab Medicine
Hospice
Hospitalist
Physiatry
Rehab Medicine

## 2022-05-10 NOTE — PROGRESS NOTE ADULT - SUBJECTIVE AND OBJECTIVE BOX
Patient is a 61y old  Male who presents with a chief complaint of Functional deficits s/p CVA (10 May 2022 11:29)      Subjective and overnight events:  Patient seen and examined at bedside. pt reports feeling well. no fever, chills, headache, sob, cp, abd pain, leg pain.    ALLERGIES:  No Known Allergies    MEDICATIONS  (STANDING):  aspirin  chewable 81 milliGRAM(s) Oral daily  atorvastatin 40 milliGRAM(s) Oral at bedtime  cyanocobalamin 1000 MICROGram(s) Oral daily  enoxaparin Injectable 40 milliGRAM(s) SubCutaneous every 24 hours  folic acid 1 milliGRAM(s) Oral daily  hydrALAZINE 50 milliGRAM(s) Oral every 8 hours  labetalol 300 milliGRAM(s) Oral three times a day  lacosamide 200 milliGRAM(s) Oral two times a day  levETIRAcetam 1000 milliGRAM(s) Oral two times a day  losartan 50 milliGRAM(s) Oral daily  melatonin 6 milliGRAM(s) Oral at bedtime  polyethylene glycol 3350 17 Gram(s) Oral every 12 hours  senna 2 Tablet(s) Oral at bedtime  thiamine 100 milliGRAM(s) Oral daily    MEDICATIONS  (PRN):  acetaminophen     Tablet .. 650 milliGRAM(s) Oral every 6 hours PRN Mild Pain (1 - 3)  bisacodyl 5 milliGRAM(s) Oral every 12 hours PRN Constipation    Vital Signs Last 24 Hrs  T(F): 98 (10 May 2022 07:55), Max: 98.1 (09 May 2022 20:09)  HR: 76 (10 May 2022 07:55) (76 - 84)  BP: 128/73 (10 May 2022 07:55) (128/73 - 145/81)  RR: 16 (10 May 2022 07:55) (16 - 16)  SpO2: 96% (09 May 2022 20:09) (96% - 96%)  I&O's Summary    09 May 2022 07:01  -  10 May 2022 07:00  --------------------------------------------------------  IN: 0 mL / OUT: 650 mL / NET: -650 mL      PHYSICAL EXAM:  General: NAD, A/O x 3  ENT: MMM  Neck: Supple, No JVD  Lungs: Clear to auscultation bilaterally  Cardio: RRR, S1/S2, No murmurs  Abdomen: Soft, Nontender, Nondistended; Bowel sounds present  Extremities: No calf tenderness, trace- 1+ right pedal pitting edema    LABS:                        10.1   6.44  )-----------( 232      ( 09 May 2022 06:30 )             30.7     05-09    142  |  106  |  18  ----------------------------<  95  4.1   |  27  |  1.22    Ca    8.8      09 May 2022 06:30    TPro  6.8  /  Alb  2.9  /  TBili  0.3  /  DBili  x   /  AST  25  /  ALT  48  /  AlkPhos  60  05-09      04-17 Chol 166 mg/dL LDL -- HDL 42 mg/dL Trig 58 mg/dL    COVID-19 PCR: NotDetec (04-25-22 @ 10:11)  COVID-19 PCR: NotDetec (04-19-22 @ 23:47)      RADIOLOGY & ADDITIONAL TESTS:    Care Discussed with Consultants/Other Providers:  d/w rehab team during IDR round
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
## 2022-05-12 PROBLEM — Z00.00 ENCOUNTER FOR PREVENTIVE HEALTH EXAMINATION: Status: ACTIVE | Noted: 2022-05-12

## 2022-06-07 ENCOUNTER — RX RENEWAL (OUTPATIENT)
Age: 62
End: 2022-06-07

## 2022-06-13 NOTE — ED ADULT TRIAGE NOTE - WEIGHT IN LBS
TRIAGE CALL:    Patient has the following symptoms:  Sore Throat    The symptoms have been present for: Yestday    Emergent, warm transferred to Triage Nurse: No    [facility]    [facility]    Contact: Kavitha Ayala    Cell Phone: [phone]      Patient notified if PCP out of office: NA      If the patient is calling after 4:45pm, contact the Triage Nurse line directly at ext [phone].       220

## 2022-08-11 PROCEDURE — 80053 COMPREHEN METABOLIC PANEL: CPT

## 2022-08-11 PROCEDURE — 82962 GLUCOSE BLOOD TEST: CPT

## 2022-08-11 PROCEDURE — 87529 HSV DNA AMP PROBE: CPT

## 2022-08-11 PROCEDURE — 84443 ASSAY THYROID STIM HORMONE: CPT

## 2022-08-11 PROCEDURE — 70498 CT ANGIOGRAPHY NECK: CPT | Mod: MA

## 2022-08-11 PROCEDURE — 83540 ASSAY OF IRON: CPT

## 2022-08-11 PROCEDURE — 82330 ASSAY OF CALCIUM: CPT

## 2022-08-11 PROCEDURE — 97167 OT EVAL HIGH COMPLEX 60 MIN: CPT

## 2022-08-11 PROCEDURE — 87798 DETECT AGENT NOS DNA AMP: CPT

## 2022-08-11 PROCEDURE — 95714 VEEG EA 12-26 HR UNMNTR: CPT

## 2022-08-11 PROCEDURE — 83036 HEMOGLOBIN GLYCOSYLATED A1C: CPT

## 2022-08-11 PROCEDURE — 82746 ASSAY OF FOLIC ACID SERUM: CPT

## 2022-08-11 PROCEDURE — 71046 X-RAY EXAM CHEST 2 VIEWS: CPT

## 2022-08-11 PROCEDURE — 82553 CREATINE MB FRACTION: CPT

## 2022-08-11 PROCEDURE — 80074 ACUTE HEPATITIS PANEL: CPT

## 2022-08-11 PROCEDURE — 82164 ANGIOTENSIN I ENZYME TEST: CPT

## 2022-08-11 PROCEDURE — 84165 PROTEIN E-PHORESIS SERUM: CPT

## 2022-08-11 PROCEDURE — 85730 THROMBOPLASTIN TIME PARTIAL: CPT

## 2022-08-11 PROCEDURE — 94640 AIRWAY INHALATION TREATMENT: CPT

## 2022-08-11 PROCEDURE — 97163 PT EVAL HIGH COMPLEX 45 MIN: CPT

## 2022-08-11 PROCEDURE — 70553 MRI BRAIN STEM W/O & W/DYE: CPT

## 2022-08-11 PROCEDURE — 36227 PLACE CATH XTRNL CAROTID: CPT

## 2022-08-11 PROCEDURE — 96375 TX/PRO/DX INJ NEW DRUG ADDON: CPT

## 2022-08-11 PROCEDURE — 86036 ANCA SCREEN EACH ANTIBODY: CPT

## 2022-08-11 PROCEDURE — 86780 TREPONEMA PALLIDUM: CPT

## 2022-08-11 PROCEDURE — 83010 ASSAY OF HAPTOGLOBIN QUANT: CPT

## 2022-08-11 PROCEDURE — 96376 TX/PRO/DX INJ SAME DRUG ADON: CPT

## 2022-08-11 PROCEDURE — 84480 ASSAY TRIIODOTHYRONINE (T3): CPT

## 2022-08-11 PROCEDURE — 86880 COOMBS TEST DIRECT: CPT

## 2022-08-11 PROCEDURE — U0003: CPT

## 2022-08-11 PROCEDURE — 84145 PROCALCITONIN (PCT): CPT

## 2022-08-11 PROCEDURE — 97116 GAIT TRAINING THERAPY: CPT

## 2022-08-11 PROCEDURE — C1887: CPT

## 2022-08-11 PROCEDURE — 82728 ASSAY OF FERRITIN: CPT

## 2022-08-11 PROCEDURE — 83605 ASSAY OF LACTIC ACID: CPT

## 2022-08-11 PROCEDURE — 87637 SARSCOV2&INF A&B&RSV AMP PRB: CPT

## 2022-08-11 PROCEDURE — 84484 ASSAY OF TROPONIN QUANT: CPT

## 2022-08-11 PROCEDURE — 97161 PT EVAL LOW COMPLEX 20 MIN: CPT

## 2022-08-11 PROCEDURE — 86850 RBC ANTIBODY SCREEN: CPT

## 2022-08-11 PROCEDURE — 95711 VEEG 2-12 HR UNMONITORED: CPT

## 2022-08-11 PROCEDURE — 84100 ASSAY OF PHOSPHORUS: CPT

## 2022-08-11 PROCEDURE — 83521 IG LIGHT CHAINS FREE EACH: CPT

## 2022-08-11 PROCEDURE — C1760: CPT

## 2022-08-11 PROCEDURE — 86901 BLOOD TYPING SEROLOGIC RH(D): CPT

## 2022-08-11 PROCEDURE — 82607 VITAMIN B-12: CPT

## 2022-08-11 PROCEDURE — 85652 RBC SED RATE AUTOMATED: CPT

## 2022-08-11 PROCEDURE — 92610 EVALUATE SWALLOWING FUNCTION: CPT

## 2022-08-11 PROCEDURE — 84295 ASSAY OF SERUM SODIUM: CPT

## 2022-08-11 PROCEDURE — 87040 BLOOD CULTURE FOR BACTERIA: CPT

## 2022-08-11 PROCEDURE — 93970 EXTREMITY STUDY: CPT

## 2022-08-11 PROCEDURE — 83735 ASSAY OF MAGNESIUM: CPT

## 2022-08-11 PROCEDURE — 36415 COLL VENOUS BLD VENIPUNCTURE: CPT

## 2022-08-11 PROCEDURE — 97110 THERAPEUTIC EXERCISES: CPT

## 2022-08-11 PROCEDURE — 99291 CRITICAL CARE FIRST HOUR: CPT | Mod: 25

## 2022-08-11 PROCEDURE — 85610 PROTHROMBIN TIME: CPT

## 2022-08-11 PROCEDURE — 70450 CT HEAD/BRAIN W/O DYE: CPT

## 2022-08-11 PROCEDURE — 86665 EPSTEIN-BARR CAPSID VCA: CPT

## 2022-08-11 PROCEDURE — C1894: CPT

## 2022-08-11 PROCEDURE — 86235 NUCLEAR ANTIGEN ANTIBODY: CPT

## 2022-08-11 PROCEDURE — 85025 COMPLETE CBC W/AUTO DIFF WBC: CPT

## 2022-08-11 PROCEDURE — 70496 CT ANGIOGRAPHY HEAD: CPT | Mod: MA

## 2022-08-11 PROCEDURE — 84436 ASSAY OF TOTAL THYROXINE: CPT

## 2022-08-11 PROCEDURE — 86663 EPSTEIN-BARR ANTIBODY: CPT

## 2022-08-11 PROCEDURE — 36226 PLACE CATH VERTEBRAL ART: CPT

## 2022-08-11 PROCEDURE — 86225 DNA ANTIBODY NATIVE: CPT

## 2022-08-11 PROCEDURE — 97130 THER IVNTJ EA ADDL 15 MIN: CPT

## 2022-08-11 PROCEDURE — 84132 ASSAY OF SERUM POTASSIUM: CPT

## 2022-08-11 PROCEDURE — 85045 AUTOMATED RETICULOCYTE COUNT: CPT

## 2022-08-11 PROCEDURE — 97129 THER IVNTJ 1ST 15 MIN: CPT

## 2022-08-11 PROCEDURE — 86803 HEPATITIS C AB TEST: CPT

## 2022-08-11 PROCEDURE — 83880 ASSAY OF NATRIURETIC PEPTIDE: CPT

## 2022-08-11 PROCEDURE — 82595 ASSAY OF CRYOGLOBULIN: CPT

## 2022-08-11 PROCEDURE — C9254: CPT

## 2022-08-11 PROCEDURE — 82232 ASSAY OF BETA-2 PROTEIN: CPT

## 2022-08-11 PROCEDURE — 95700 EEG CONT REC W/VID EEG TECH: CPT

## 2022-08-11 PROCEDURE — 92507 TX SP LANG VOICE COMM INDIV: CPT

## 2022-08-11 PROCEDURE — 86664 EPSTEIN-BARR NUCLEAR ANTIGEN: CPT

## 2022-08-11 PROCEDURE — 87389 HIV-1 AG W/HIV-1&-2 AB AG IA: CPT

## 2022-08-11 PROCEDURE — 97166 OT EVAL MOD COMPLEX 45 MIN: CPT

## 2022-08-11 PROCEDURE — 80048 BASIC METABOLIC PNL TOTAL CA: CPT

## 2022-08-11 PROCEDURE — C1769: CPT

## 2022-08-11 PROCEDURE — 97535 SELF CARE MNGMENT TRAINING: CPT

## 2022-08-11 PROCEDURE — 83550 IRON BINDING TEST: CPT

## 2022-08-11 PROCEDURE — 97530 THERAPEUTIC ACTIVITIES: CPT

## 2022-08-11 PROCEDURE — 86038 ANTINUCLEAR ANTIBODIES: CPT

## 2022-08-11 PROCEDURE — 97112 NEUROMUSCULAR REEDUCATION: CPT

## 2022-08-11 PROCEDURE — 71045 X-RAY EXAM CHEST 1 VIEW: CPT

## 2022-08-11 PROCEDURE — 82565 ASSAY OF CREATININE: CPT

## 2022-08-11 PROCEDURE — 93005 ELECTROCARDIOGRAM TRACING: CPT

## 2022-08-11 PROCEDURE — 93306 TTE W/DOPPLER COMPLETE: CPT

## 2022-08-11 PROCEDURE — 82435 ASSAY OF BLOOD CHLORIDE: CPT

## 2022-08-11 PROCEDURE — 86334 IMMUNOFIX E-PHORESIS SERUM: CPT

## 2022-08-11 PROCEDURE — 86381 MITOCHONDRIAL ANTIBODY EACH: CPT

## 2022-08-11 PROCEDURE — 82947 ASSAY GLUCOSE BLOOD QUANT: CPT

## 2022-08-11 PROCEDURE — 85027 COMPLETE CBC AUTOMATED: CPT

## 2022-08-11 PROCEDURE — 85018 HEMOGLOBIN: CPT

## 2022-08-11 PROCEDURE — 96374 THER/PROPH/DIAG INJ IV PUSH: CPT

## 2022-08-11 PROCEDURE — 92526 ORAL FUNCTION THERAPY: CPT

## 2022-08-11 PROCEDURE — 80061 LIPID PANEL: CPT

## 2022-08-11 PROCEDURE — U0005: CPT

## 2022-08-11 PROCEDURE — 36224 PLACE CATH CAROTD ART: CPT

## 2022-08-11 PROCEDURE — 80076 HEPATIC FUNCTION PANEL: CPT

## 2022-08-11 PROCEDURE — 86431 RHEUMATOID FACTOR QUANT: CPT

## 2022-08-11 PROCEDURE — 86140 C-REACTIVE PROTEIN: CPT

## 2022-08-11 PROCEDURE — 86900 BLOOD TYPING SEROLOGIC ABO: CPT

## 2022-08-11 PROCEDURE — 82803 BLOOD GASES ANY COMBINATION: CPT

## 2022-08-11 PROCEDURE — A9585: CPT

## 2022-08-11 PROCEDURE — 85014 HEMATOCRIT: CPT

## 2022-08-11 PROCEDURE — 92523 SPEECH SOUND LANG COMPREHEN: CPT

## 2022-08-11 PROCEDURE — 84155 ASSAY OF PROTEIN SERUM: CPT

## 2022-09-12 NOTE — DIETITIAN INITIAL EVALUATION ADULT - OBTAIN DAILY WEIGHT
Casey Matute Jr, MD  [facility] EAR NOSE & THROAT  [address], [address]  [place] [address]  Fax [phone]  Phone [phone]      Visit Type: FOLLOW UP   Chief Complaint   Patient presents with   • Follow-up     3 month recheck ears        HPI   Accompanied by: Mother  She presents for a follow up evaluation. She has had no change in hearing. No pan in ears.  She has sniffles and is being treated with antibiotics.   Voc rehab declined hearing aids.    Past Medical History:   Diagnosis Date   • Anxiety    • Asthma    • HL (hearing loss)    • PCOS (polycystic ovarian syndrome)        Past Surgical History:   Procedure Laterality Date   • ADENOIDECTOMY     • HAND SURGERY Right    • MYRINGOPLASTY Right 5/13/2022    Procedure: MYRINGOPLASTY, Examination under anesthesia;  Surgeon: Casey Matute Jr., MD;  Location: [facility];  Service: ENT;  Laterality: Right;   • MYRINGOTOMY W/ TUBES      at 1 years old   • TONSILLECTOMY     • TYMPANOPLASTY         Family History: Her family history is not on file.     Social History: She  reports that she has never smoked. She has never used smokeless tobacco. She reports that she does not drink alcohol and does not use drugs.    Home Medications:  Fluticasone Furoate-Vilanterol, albuterol sulfate HFA, cefdinir, cetirizine, escitalopram, losartan, metFORMIN, montelukast, multivitamin with minerals, and norethindrone-ethinyl estradiol    Allergies:  She is allergic to betadine [povidone-iodine], latex, and oxycodone.       Vital Signs:   Temp:  [97 °F (36.1 °C)] 97 °F (36.1 °C)  Heart Rate:  [109] 109  BP: (149)/(97) 149/97  ENT Physical Exam  Constitutional  Appearance: patient appears well-developed and well-nourished,  Communication/Voice: communication appropriate for developmental age; vocal quality normal;  Head and Face  Appearance: head appears normal, face appears normal and face appears  atraumatic;  Palpation: facial palpation normal;  Salivary: glands normal;  Ear  Hearing: intact;  Auricles: right auricle normal; left auricle normal;  External Mastoids: right external mastoid normal; left external mastoid normal; External mastoid comments: Right postauricular incision   Ear Canals: right ear canal normal; left ear canal normal; Ear Canal comments: Mild right surgical changes  Tympanic Membranes: right tympanic membrane perforated (50% posterior and inferior); marginal perforation posterior and inferior; perforation size: 50%; left tympanic membrane perforated; central perforation anterior and inferior; perforation size: 3%; Tympanic Membrane comments: Right middle ear clean and dry   Nose  External Nose: nares patent bilaterally; external nose normal;  Oral Cavity/Oropharynx  Lips: normal;  Neck  Neck: neck normal;  Respiratory  Inspection: breathing unlabored; normal breathing rate;  Cardiovascular  Inspection: extremities are warm and well perfused; no peripheral edema present;  Neurovestibular  Mental Status: alert and oriented;  Psychiatric: mood normal; affect is appropriate;  Drawings            Result Review    RESULTS REVIEW    I have reviewed the patients old records in the chart.   I have reviewed the patients old records in the chart.     Assessment & Plan    Diagnoses and all orders for this visit:    1. S/P tympanoplasty (Primary)  Overview:  myringoplasty    Orders:  -     Comprehensive Hearing Test; Future    2. Tympanosclerosis of both ears  Overview:  Added automatically from request for surgery [id number]    Orders:  -     Comprehensive Hearing Test; Future    3. Conductive hearing loss, bilateral    4. Tinnitus of both ears    5. Otalgia of both ears    6. Perforation of right tympanic membrane  -     Comprehensive Hearing Test; Future    7. Perforated tympanic membrane, left     Continue current management plan.     Patient is stable perforation of the left side that is quite  minute.  I feel this needs to remain in position as she probably has some degree of chronic otitis media and eustachian tube dysfunction.  On the left side, the perforation is larger, but dry.  I will refer her to Dr. Toro for possible endoscopic tympanic membrane repair.  I will get an audio prior to her visit so that she is prepared for surgery if required.  Water precautions  Hearing amplification as desired  Call for ear problems  Audio in 3 months      My Chart:  Patient is using My Chart    Patient, Mother understand(s) and agree(s) with the treatment plan as described.    Return in about 3 months (around 12/12/2022), or with Audio, for Recheck R ear.      Casey Matute Jr, MD  09/12/22  09:55 CDT   yes

## 2022-09-19 NOTE — PROVIDER CONTACT NOTE (OTHER) - NAME OF MD/NP/PA/DO NOTIFIED:
Pediatric Neurology  [facility]  Pediatric Specialty Clinic      Pediatric Call Center [phone]  RN Care Coordinator:  [phone]    After Hours and Emergency:  [phone]    Prescription renewals:  Your pharmacy must fax request to [phone]  Please allow 2-3 days for prescriptions to be authorized    Scheduling numbers for common referrals:   [phone]   Neuropsychology:  [phone]    If your physician has ordered an x-ray or MRI, please schedule this test at the , or you may call [phone] to schedule.    If your child is going to be ADMITTED to [facility] for testing or a procedure, they will need a PCR COVID test within 4 days of admission.  [facility] team should contact you to schedule a COVID test. If they do not contact you, please call [phone] to schedule a test.    Please consider signing up for [employer] for confidential electronic communication and access to your health records.  Please sign up at the , or go to [email or web address].    Plan:   - Follow-up with Neurology at 18-20 mo of age ([facility]; Dr. Alvin Watson Ducor)  - Feel free to reach out to Neurology if questions/concerns arise in the meantime  - If concerns for seizures, try to take a video or call Neurology clinic to describe          Marquita Watson, PA Marquita Watson, NP

## 2022-10-21 NOTE — DISCHARGE NOTE NURSING/CASE MANAGEMENT/SOCIAL WORK - NSDCPEPTSTROKESIGNS_GEN_ALL_CORE
Sudden numbness or weakness of the face, arm, or leg, especially on one side of the body. Confusion, trouble speaking or understanding. Trouble seeing in one or both eyes. Trouble walking, dizziness, loss of balance or coordination. Severe headache.
No

## 2023-01-23 NOTE — SPEECH LANGUAGE PATHOLOGY EVALUATION - BODY PART ID
"  [address]  [place] [address]  Phone: [phone]   [phone]    Patient's PCP:Jefe Pickens Aurelio Humphrey MD;  Location: [facility] OR;  Service: Ophthalmology;  Laterality: Right;    CHOLECYSTECTOMY      COLONOSCOPY  ~2013    [facility] in [place], ANISA POSS;  Surgeon: Brian Green MD;  Location: [facility] CATH LAB;  Service: Cardiology;  Laterality: Right;    SINUS SURGERY      x 2    TOTAL ABDOMINAL HYSTERECTOMY W/ BILATERAL SALPINGOOPHORECTOMY      UPPER GASTROINTESTINAL ENDOSCOPY  ~2013    [facility] in [place], Jose Manuel Blanco DPM  Authorized by: Jose Manuel Blanco intact
